# Patient Record
Sex: FEMALE | Race: WHITE | NOT HISPANIC OR LATINO | Employment: OTHER | URBAN - METROPOLITAN AREA
[De-identification: names, ages, dates, MRNs, and addresses within clinical notes are randomized per-mention and may not be internally consistent; named-entity substitution may affect disease eponyms.]

---

## 2017-01-05 ENCOUNTER — RECORDS - HEALTHEAST (OUTPATIENT)
Dept: LAB | Facility: CLINIC | Age: 82
End: 2017-01-05

## 2017-01-05 LAB
CHOLEST SERPL-MCNC: 197 MG/DL
FASTING STATUS PATIENT QL REPORTED: NORMAL
HDLC SERPL-MCNC: 76 MG/DL
LDLC SERPL CALC-MCNC: 97 MG/DL
TRIGL SERPL-MCNC: 120 MG/DL

## 2017-06-05 ENCOUNTER — RECORDS - HEALTHEAST (OUTPATIENT)
Dept: LAB | Facility: CLINIC | Age: 82
End: 2017-06-05

## 2017-06-06 LAB
BASOPHILS # BLD AUTO: 0 THOU/UL (ref 0–0.2)
BASOPHILS NFR BLD AUTO: 1 % (ref 0–2)
EOSINOPHIL # BLD AUTO: 0.1 THOU/UL (ref 0–0.4)
EOSINOPHIL NFR BLD AUTO: 2 % (ref 0–6)
ERYTHROCYTE [DISTWIDTH] IN BLOOD BY AUTOMATED COUNT: 12 % (ref 11–14.5)
HCT VFR BLD AUTO: 45.1 % (ref 35–47)
HGB BLD-MCNC: 14.9 G/DL (ref 12–16)
LYMPHOCYTES # BLD AUTO: 1.4 THOU/UL (ref 0.8–4.4)
LYMPHOCYTES NFR BLD AUTO: 26 % (ref 20–40)
MCH RBC QN AUTO: 32.5 PG (ref 27–34)
MCHC RBC AUTO-ENTMCNC: 33 G/DL (ref 32–36)
MCV RBC AUTO: 98 FL (ref 80–100)
MONOCYTES # BLD AUTO: 0.5 THOU/UL (ref 0–0.9)
MONOCYTES NFR BLD AUTO: 9 % (ref 2–10)
NEUTROPHILS # BLD AUTO: 3.4 THOU/UL (ref 2–7.7)
NEUTROPHILS NFR BLD AUTO: 62 % (ref 50–70)
PATH REPORT.MICROSCOPIC SPEC OTHER STN: ABNORMAL
PLATELET # BLD AUTO: 300 THOU/UL (ref 140–440)
PMV BLD AUTO: 10.7 FL (ref 8.5–12.5)
RBC # BLD AUTO: 4.59 MILL/UL (ref 3.8–5.4)
WBC: 5.4 THOU/UL (ref 4–11)

## 2017-06-08 LAB
LAB AP CHARGES (HE HISTORICAL CONVERSION): NORMAL
PATH REPORT.COMMENTS IMP SPEC: NORMAL
PATH REPORT.COMMENTS IMP SPEC: NORMAL
PATH REPORT.FINAL DX SPEC: NORMAL
PATH REPORT.RELEVANT HX SPEC: NORMAL

## 2017-07-06 ENCOUNTER — RECORDS - HEALTHEAST (OUTPATIENT)
Dept: LAB | Facility: CLINIC | Age: 82
End: 2017-07-06

## 2017-07-06 LAB
CHOLEST SERPL-MCNC: 224 MG/DL
FASTING STATUS PATIENT QL REPORTED: YES
HDLC SERPL-MCNC: 59 MG/DL
LDLC SERPL CALC-MCNC: 131 MG/DL
TRIGL SERPL-MCNC: 172 MG/DL

## 2018-04-09 ENCOUNTER — RECORDS - HEALTHEAST (OUTPATIENT)
Dept: LAB | Facility: CLINIC | Age: 83
End: 2018-04-09

## 2018-04-09 LAB
ANION GAP SERPL CALCULATED.3IONS-SCNC: 9 MMOL/L (ref 5–18)
BUN SERPL-MCNC: 22 MG/DL (ref 8–28)
CALCIUM SERPL-MCNC: 10.4 MG/DL (ref 8.5–10.5)
CHLORIDE BLD-SCNC: 106 MMOL/L (ref 98–107)
CHOLEST SERPL-MCNC: 200 MG/DL
CO2 SERPL-SCNC: 25 MMOL/L (ref 22–31)
CREAT SERPL-MCNC: 0.8 MG/DL (ref 0.6–1.1)
FASTING STATUS PATIENT QL REPORTED: ABNORMAL
GFR SERPL CREATININE-BSD FRML MDRD: >60 ML/MIN/1.73M2
GLUCOSE BLD-MCNC: 121 MG/DL (ref 70–125)
HDLC SERPL-MCNC: 69 MG/DL
LDLC SERPL CALC-MCNC: 106 MG/DL
POTASSIUM BLD-SCNC: 4.7 MMOL/L (ref 3.5–5)
SODIUM SERPL-SCNC: 140 MMOL/L (ref 136–145)
TRIGL SERPL-MCNC: 125 MG/DL

## 2018-07-02 ENCOUNTER — RECORDS - HEALTHEAST (OUTPATIENT)
Dept: LAB | Facility: CLINIC | Age: 83
End: 2018-07-02

## 2018-07-04 LAB — BACTERIA SPEC CULT: NO GROWTH

## 2018-07-25 ENCOUNTER — RECORDS - HEALTHEAST (OUTPATIENT)
Dept: LAB | Facility: CLINIC | Age: 83
End: 2018-07-25

## 2018-07-27 LAB — BACTERIA SPEC CULT: NORMAL

## 2018-09-21 ENCOUNTER — RECORDS - HEALTHEAST (OUTPATIENT)
Dept: LAB | Facility: CLINIC | Age: 83
End: 2018-09-21

## 2018-09-23 LAB — BACTERIA SPEC CULT: ABNORMAL

## 2018-10-11 ENCOUNTER — RECORDS - HEALTHEAST (OUTPATIENT)
Dept: LAB | Facility: CLINIC | Age: 83
End: 2018-10-11

## 2018-10-11 LAB — TSH SERPL DL<=0.005 MIU/L-ACNC: 0.08 UIU/ML (ref 0.3–5)

## 2018-10-12 LAB — 25(OH)D3 SERPL-MCNC: 26.3 NG/ML (ref 30–80)

## 2018-11-07 ENCOUNTER — RECORDS - HEALTHEAST (OUTPATIENT)
Dept: ADMINISTRATIVE | Facility: OTHER | Age: 83
End: 2018-11-07

## 2018-11-13 ENCOUNTER — HOSPITAL ENCOUNTER (OUTPATIENT)
Dept: NUCLEAR MEDICINE | Facility: HOSPITAL | Age: 83
Discharge: HOME | End: 2018-11-13

## 2018-11-13 DIAGNOSIS — E05.90 HYPERTHYROIDISM: ICD-10-CM

## 2018-11-14 ENCOUNTER — HOSPITAL ENCOUNTER (OUTPATIENT)
Dept: NUCLEAR MEDICINE | Facility: HOSPITAL | Age: 83
Discharge: HOME | End: 2018-11-14

## 2018-11-15 ENCOUNTER — RECORDS - HEALTHEAST (OUTPATIENT)
Dept: LAB | Facility: CLINIC | Age: 83
End: 2018-11-15

## 2018-11-15 LAB
ALBUMIN SERPL-MCNC: 3.8 G/DL (ref 3.5–5)
ALP SERPL-CCNC: 58 U/L (ref 45–120)
ALT SERPL W P-5'-P-CCNC: 12 U/L (ref 0–45)
ANION GAP SERPL CALCULATED.3IONS-SCNC: 11 MMOL/L (ref 5–18)
AST SERPL W P-5'-P-CCNC: 16 U/L (ref 0–40)
BILIRUB SERPL-MCNC: 0.6 MG/DL (ref 0–1)
BUN SERPL-MCNC: 22 MG/DL (ref 8–28)
CALCIUM SERPL-MCNC: 10.5 MG/DL (ref 8.5–10.5)
CHLORIDE BLD-SCNC: 105 MMOL/L (ref 98–107)
CO2 SERPL-SCNC: 25 MMOL/L (ref 22–31)
CREAT SERPL-MCNC: 0.77 MG/DL (ref 0.6–1.1)
GFR SERPL CREATININE-BSD FRML MDRD: >60 ML/MIN/1.73M2
GLUCOSE BLD-MCNC: 112 MG/DL (ref 70–125)
POTASSIUM BLD-SCNC: 4.4 MMOL/L (ref 3.5–5)
PROT SERPL-MCNC: 7 G/DL (ref 6–8)
SODIUM SERPL-SCNC: 141 MMOL/L (ref 136–145)

## 2018-12-01 ENCOUNTER — RECORDS - HEALTHEAST (OUTPATIENT)
Dept: LAB | Facility: CLINIC | Age: 83
End: 2018-12-01

## 2018-12-03 ENCOUNTER — HOSPITAL ENCOUNTER (OUTPATIENT)
Facility: CLINIC | Age: 83
End: 2018-12-03
Attending: INTERNAL MEDICINE | Admitting: INTERNAL MEDICINE
Payer: MEDICARE

## 2018-12-04 LAB
BACTERIA SPEC CULT: ABNORMAL
BACTERIA SPEC CULT: ABNORMAL

## 2018-12-20 RX ORDER — SUCRALFATE 1 G/1
1 TABLET ORAL
COMMUNITY
End: 2018-12-24 | Stop reason: HOSPADM

## 2018-12-20 RX ORDER — ASPIRIN 81 MG/1
81 TABLET ORAL DAILY
COMMUNITY
End: 2018-12-24 | Stop reason: HOSPADM

## 2018-12-20 RX ORDER — PRAVASTATIN SODIUM 20 MG
20 TABLET ORAL DAILY
COMMUNITY
End: 2018-12-24 | Stop reason: HOSPADM

## 2018-12-20 RX ORDER — AMLODIPINE BESYLATE 5 MG/1
5 TABLET ORAL DAILY
COMMUNITY
End: 2018-12-24 | Stop reason: HOSPADM

## 2018-12-20 RX ORDER — OMEPRAZOLE 40 MG/1
40 CAPSULE, DELAYED RELEASE ORAL AT BEDTIME
COMMUNITY
End: 2018-12-24 | Stop reason: HOSPADM

## 2018-12-20 SDOH — HEALTH STABILITY: MENTAL HEALTH: HOW OFTEN DO YOU HAVE A DRINK CONTAINING ALCOHOL?: NEVER

## 2018-12-31 ENCOUNTER — RECORDS - HEALTHEAST (OUTPATIENT)
Dept: LAB | Facility: CLINIC | Age: 83
End: 2018-12-31

## 2018-12-31 ENCOUNTER — RECORDS - HEALTHEAST (OUTPATIENT)
Dept: ADMINISTRATIVE | Facility: OTHER | Age: 83
End: 2018-12-31

## 2018-12-31 LAB
ANION GAP SERPL CALCULATED.3IONS-SCNC: 13 MMOL/L (ref 5–18)
BUN SERPL-MCNC: 39 MG/DL (ref 8–28)
CALCIUM SERPL-MCNC: 9.5 MG/DL (ref 8.5–10.5)
CHLORIDE BLD-SCNC: 111 MMOL/L (ref 98–107)
CO2 SERPL-SCNC: 14 MMOL/L (ref 22–31)
CREAT SERPL-MCNC: 2.29 MG/DL (ref 0.6–1.1)
GFR SERPL CREATININE-BSD FRML MDRD: 20 ML/MIN/1.73M2
GLUCOSE BLD-MCNC: 109 MG/DL (ref 70–125)
POTASSIUM BLD-SCNC: 3.6 MMOL/L (ref 3.5–5)
SODIUM SERPL-SCNC: 138 MMOL/L (ref 136–145)

## 2019-01-01 LAB — BACTERIA SPEC CULT: NO GROWTH

## 2019-01-07 ENCOUNTER — RECORDS - HEALTHEAST (OUTPATIENT)
Dept: LAB | Facility: CLINIC | Age: 84
End: 2019-01-07

## 2019-01-07 LAB
ALBUMIN SERPL-MCNC: 3.2 G/DL (ref 3.5–5)
ALP SERPL-CCNC: 64 U/L (ref 45–120)
ALT SERPL W P-5'-P-CCNC: 9 U/L (ref 0–45)
ANION GAP SERPL CALCULATED.3IONS-SCNC: 6 MMOL/L (ref 5–18)
AST SERPL W P-5'-P-CCNC: 14 U/L (ref 0–40)
BILIRUB SERPL-MCNC: 0.2 MG/DL (ref 0–1)
BUN SERPL-MCNC: 33 MG/DL (ref 8–28)
CALCIUM SERPL-MCNC: 9.5 MG/DL (ref 8.5–10.5)
CHLORIDE BLD-SCNC: 108 MMOL/L (ref 98–107)
CO2 SERPL-SCNC: 24 MMOL/L (ref 22–31)
CREAT SERPL-MCNC: 1.64 MG/DL (ref 0.6–1.1)
ERYTHROCYTE [DISTWIDTH] IN BLOOD BY AUTOMATED COUNT: 13.7 % (ref 11–14.5)
GFR SERPL CREATININE-BSD FRML MDRD: 29 ML/MIN/1.73M2
GLUCOSE BLD-MCNC: 139 MG/DL (ref 70–125)
HCT VFR BLD AUTO: 36 % (ref 35–47)
HGB BLD-MCNC: 11.5 G/DL (ref 12–16)
MCH RBC QN AUTO: 30.8 PG (ref 27–34)
MCHC RBC AUTO-ENTMCNC: 31.9 G/DL (ref 32–36)
MCV RBC AUTO: 97 FL (ref 80–100)
PLATELET # BLD AUTO: 334 THOU/UL (ref 140–440)
PMV BLD AUTO: 10.2 FL (ref 8.5–12.5)
POTASSIUM BLD-SCNC: 4.3 MMOL/L (ref 3.5–5)
PROT SERPL-MCNC: 7.4 G/DL (ref 6–8)
RBC # BLD AUTO: 3.73 MILL/UL (ref 3.8–5.4)
SODIUM SERPL-SCNC: 138 MMOL/L (ref 136–145)
T4 FREE SERPL-MCNC: 1 NG/DL (ref 0.7–1.8)
TSH SERPL DL<=0.005 MIU/L-ACNC: 0.02 UIU/ML (ref 0.3–5)
WBC: 8.2 THOU/UL (ref 4–11)

## 2019-01-08 ENCOUNTER — RECORDS - HEALTHEAST (OUTPATIENT)
Dept: ADMINISTRATIVE | Facility: OTHER | Age: 84
End: 2019-01-08

## 2019-01-10 ENCOUNTER — RECORDS - HEALTHEAST (OUTPATIENT)
Dept: ADMINISTRATIVE | Facility: OTHER | Age: 84
End: 2019-01-10

## 2019-01-11 ENCOUNTER — RECORDS - HEALTHEAST (OUTPATIENT)
Dept: ADMINISTRATIVE | Facility: OTHER | Age: 84
End: 2019-01-11

## 2019-01-14 ENCOUNTER — RECORDS - HEALTHEAST (OUTPATIENT)
Dept: LAB | Facility: CLINIC | Age: 84
End: 2019-01-14

## 2019-01-14 LAB
ALBUMIN SERPL-MCNC: 3.3 G/DL (ref 3.5–5)
ALBUMIN UR-MCNC: NEGATIVE MG/DL
ALP SERPL-CCNC: 53 U/L (ref 45–120)
ALT SERPL W P-5'-P-CCNC: 15 U/L (ref 0–45)
ANION GAP SERPL CALCULATED.3IONS-SCNC: 11 MMOL/L (ref 5–18)
APPEARANCE UR: CLEAR
AST SERPL W P-5'-P-CCNC: 18 U/L (ref 0–40)
BILIRUB SERPL-MCNC: 0.4 MG/DL (ref 0–1)
BILIRUB UR QL STRIP: NEGATIVE
BUN SERPL-MCNC: 29 MG/DL (ref 8–28)
CALCIUM SERPL-MCNC: 9.8 MG/DL (ref 8.5–10.5)
CHLORIDE BLD-SCNC: 108 MMOL/L (ref 98–107)
CO2 SERPL-SCNC: 19 MMOL/L (ref 22–31)
COLOR UR AUTO: NORMAL
CREAT SERPL-MCNC: 1.32 MG/DL (ref 0.6–1.1)
CREAT UR-MCNC: 25.6 MG/DL
GFR SERPL CREATININE-BSD FRML MDRD: 38 ML/MIN/1.73M2
GLUCOSE BLD-MCNC: 113 MG/DL (ref 70–125)
GLUCOSE UR STRIP-MCNC: NEGATIVE MG/DL
HGB BLD-MCNC: 11.9 G/DL (ref 12–16)
HGB UR QL STRIP: NEGATIVE
KETONES UR STRIP-MCNC: NEGATIVE MG/DL
LEUKOCYTE ESTERASE UR QL STRIP: NEGATIVE
NITRATE UR QL: NEGATIVE
PH UR STRIP: 7 [PH] (ref 4.5–8)
PHOSPHATE SERPL-MCNC: 3 MG/DL (ref 2.5–4.5)
POTASSIUM BLD-SCNC: 4.4 MMOL/L (ref 3.5–5)
PROT SERPL-MCNC: 7.3 G/DL (ref 6–8)
SODIUM SERPL-SCNC: 138 MMOL/L (ref 136–145)
SP GR UR STRIP: 1.01 (ref 1–1.03)
UROBILINOGEN UR STRIP-ACNC: NORMAL

## 2019-01-21 ENCOUNTER — RECORDS - HEALTHEAST (OUTPATIENT)
Dept: LAB | Facility: CLINIC | Age: 84
End: 2019-01-21

## 2019-01-21 LAB
ALBUMIN SERPL-MCNC: 3.4 G/DL (ref 3.5–5)
ALBUMIN UR-MCNC: NEGATIVE MG/DL
ALP SERPL-CCNC: 51 U/L (ref 45–120)
ALT SERPL W P-5'-P-CCNC: 11 U/L (ref 0–45)
ANION GAP SERPL CALCULATED.3IONS-SCNC: 9 MMOL/L (ref 5–18)
AST SERPL W P-5'-P-CCNC: 15 U/L (ref 0–40)
BILIRUB SERPL-MCNC: 0.4 MG/DL (ref 0–1)
BUN SERPL-MCNC: 26 MG/DL (ref 8–28)
CALCIUM SERPL-MCNC: 9.7 MG/DL (ref 8.5–10.5)
CHLORIDE BLD-SCNC: 107 MMOL/L (ref 98–107)
CO2 SERPL-SCNC: 22 MMOL/L (ref 22–31)
CREAT SERPL-MCNC: 1.22 MG/DL (ref 0.6–1.1)
CREAT UR-MCNC: 21 MG/DL
ERYTHROCYTE [DISTWIDTH] IN BLOOD BY AUTOMATED COUNT: 13.2 % (ref 11–14.5)
GFR SERPL CREATININE-BSD FRML MDRD: 42 ML/MIN/1.73M2
GLUCOSE BLD-MCNC: 123 MG/DL (ref 70–125)
HCT VFR BLD AUTO: 37.4 % (ref 35–47)
HGB BLD-MCNC: 11.8 G/DL (ref 12–16)
MCH RBC QN AUTO: 30.6 PG (ref 27–34)
MCHC RBC AUTO-ENTMCNC: 31.6 G/DL (ref 32–36)
MCV RBC AUTO: 97 FL (ref 80–100)
PLATELET # BLD AUTO: 290 THOU/UL (ref 140–440)
PMV BLD AUTO: 10.5 FL (ref 8.5–12.5)
POTASSIUM BLD-SCNC: 4.2 MMOL/L (ref 3.5–5)
PROT SERPL-MCNC: 7.2 G/DL (ref 6–8)
RBC # BLD AUTO: 3.86 MILL/UL (ref 3.8–5.4)
SODIUM SERPL-SCNC: 138 MMOL/L (ref 136–145)
WBC: 7.7 THOU/UL (ref 4–11)

## 2019-02-04 ENCOUNTER — RECORDS - HEALTHEAST (OUTPATIENT)
Dept: LAB | Facility: CLINIC | Age: 84
End: 2019-02-04

## 2019-02-04 LAB
ALBUMIN UR-MCNC: NEGATIVE MG/DL
ANION GAP SERPL CALCULATED.3IONS-SCNC: 10 MMOL/L (ref 5–18)
BUN SERPL-MCNC: 27 MG/DL (ref 8–28)
CALCIUM SERPL-MCNC: 10.2 MG/DL (ref 8.5–10.5)
CHLORIDE BLD-SCNC: 107 MMOL/L (ref 98–107)
CO2 SERPL-SCNC: 21 MMOL/L (ref 22–31)
CREAT SERPL-MCNC: 1.2 MG/DL (ref 0.6–1.1)
GFR SERPL CREATININE-BSD FRML MDRD: 42 ML/MIN/1.73M2
GLUCOSE BLD-MCNC: 115 MG/DL (ref 70–125)
POTASSIUM BLD-SCNC: 4.2 MMOL/L (ref 3.5–5)
SODIUM SERPL-SCNC: 138 MMOL/L (ref 136–145)

## 2019-02-25 ENCOUNTER — RECORDS - HEALTHEAST (OUTPATIENT)
Dept: LAB | Facility: CLINIC | Age: 84
End: 2019-02-25

## 2019-02-26 LAB — BACTERIA SPEC CULT: NO GROWTH

## 2019-02-28 ENCOUNTER — RECORDS - HEALTHEAST (OUTPATIENT)
Dept: ADMINISTRATIVE | Facility: OTHER | Age: 84
End: 2019-02-28

## 2019-03-13 ENCOUNTER — HOSPITAL ENCOUNTER (OUTPATIENT)
Dept: NUCLEAR MEDICINE | Facility: HOSPITAL | Age: 84
Discharge: HOME | End: 2019-03-13

## 2019-03-13 DIAGNOSIS — E05.20 TOXIC MULTINODULAR GOITER W/O CRISIS: ICD-10-CM

## 2019-04-08 ENCOUNTER — RECORDS - HEALTHEAST (OUTPATIENT)
Dept: LAB | Facility: CLINIC | Age: 84
End: 2019-04-08

## 2019-04-08 LAB
ANION GAP SERPL CALCULATED.3IONS-SCNC: 12 MMOL/L (ref 5–18)
BUN SERPL-MCNC: 41 MG/DL (ref 8–28)
CALCIUM SERPL-MCNC: 10.2 MG/DL (ref 8.5–10.5)
CHLORIDE BLD-SCNC: 110 MMOL/L (ref 98–107)
CO2 SERPL-SCNC: 17 MMOL/L (ref 22–31)
CREAT SERPL-MCNC: 2.22 MG/DL (ref 0.6–1.1)
GFR SERPL CREATININE-BSD FRML MDRD: 21 ML/MIN/1.73M2
GLUCOSE BLD-MCNC: 95 MG/DL (ref 70–125)
POTASSIUM BLD-SCNC: 4.2 MMOL/L (ref 3.5–5)
SODIUM SERPL-SCNC: 139 MMOL/L (ref 136–145)

## 2019-04-15 ENCOUNTER — RECORDS - HEALTHEAST (OUTPATIENT)
Dept: LAB | Facility: CLINIC | Age: 84
End: 2019-04-15

## 2019-04-15 LAB
ANION GAP SERPL CALCULATED.3IONS-SCNC: 6 MMOL/L (ref 5–18)
BUN SERPL-MCNC: 37 MG/DL (ref 8–28)
CALCIUM SERPL-MCNC: 10.1 MG/DL (ref 8.5–10.5)
CHLORIDE BLD-SCNC: 110 MMOL/L (ref 98–107)
CO2 SERPL-SCNC: 23 MMOL/L (ref 22–31)
CREAT SERPL-MCNC: 1.96 MG/DL (ref 0.6–1.1)
GFR SERPL CREATININE-BSD FRML MDRD: 24 ML/MIN/1.73M2
GLUCOSE BLD-MCNC: 122 MG/DL (ref 70–125)
POTASSIUM BLD-SCNC: 4.5 MMOL/L (ref 3.5–5)
SODIUM SERPL-SCNC: 139 MMOL/L (ref 136–145)

## 2019-04-22 ENCOUNTER — RECORDS - HEALTHEAST (OUTPATIENT)
Dept: LAB | Facility: CLINIC | Age: 84
End: 2019-04-22

## 2019-04-22 LAB
ANION GAP SERPL CALCULATED.3IONS-SCNC: 8 MMOL/L (ref 5–18)
BUN SERPL-MCNC: 38 MG/DL (ref 8–28)
CALCIUM SERPL-MCNC: 9.6 MG/DL (ref 8.5–10.5)
CHLORIDE BLD-SCNC: 107 MMOL/L (ref 98–107)
CO2 SERPL-SCNC: 23 MMOL/L (ref 22–31)
CREAT SERPL-MCNC: 1.52 MG/DL (ref 0.6–1.1)
GFR SERPL CREATININE-BSD FRML MDRD: 32 ML/MIN/1.73M2
GLUCOSE BLD-MCNC: 126 MG/DL (ref 70–125)
POTASSIUM BLD-SCNC: 4 MMOL/L (ref 3.5–5)
SODIUM SERPL-SCNC: 138 MMOL/L (ref 136–145)

## 2019-04-29 ENCOUNTER — RECORDS - HEALTHEAST (OUTPATIENT)
Dept: LAB | Facility: CLINIC | Age: 84
End: 2019-04-29

## 2019-04-29 LAB
ANION GAP SERPL CALCULATED.3IONS-SCNC: 10 MMOL/L (ref 5–18)
BUN SERPL-MCNC: 29 MG/DL (ref 8–28)
CALCIUM SERPL-MCNC: 10.1 MG/DL (ref 8.5–10.5)
CHLORIDE BLD-SCNC: 108 MMOL/L (ref 98–107)
CO2 SERPL-SCNC: 22 MMOL/L (ref 22–31)
CREAT SERPL-MCNC: 1.44 MG/DL (ref 0.6–1.1)
GFR SERPL CREATININE-BSD FRML MDRD: 34 ML/MIN/1.73M2
GLUCOSE BLD-MCNC: 112 MG/DL (ref 70–125)
POTASSIUM BLD-SCNC: 4.2 MMOL/L (ref 3.5–5)
SODIUM SERPL-SCNC: 140 MMOL/L (ref 136–145)

## 2019-05-06 ENCOUNTER — RECORDS - HEALTHEAST (OUTPATIENT)
Dept: LAB | Facility: CLINIC | Age: 84
End: 2019-05-06

## 2019-05-06 LAB
ALBUMIN SERPL-MCNC: 3.6 G/DL (ref 3.5–5)
ALBUMIN UR-MCNC: NEGATIVE MG/DL
ANION GAP SERPL CALCULATED.3IONS-SCNC: 12 MMOL/L (ref 5–18)
APPEARANCE UR: CLEAR
BACTERIA #/AREA URNS HPF: ABNORMAL HPF
BASOPHILS # BLD AUTO: 0 THOU/UL (ref 0–0.2)
BASOPHILS NFR BLD AUTO: 1 % (ref 0–2)
BILIRUB UR QL STRIP: NEGATIVE
BUN SERPL-MCNC: 39 MG/DL (ref 8–28)
CALCIUM SERPL-MCNC: 10.2 MG/DL (ref 8.5–10.5)
CHLORIDE BLD-SCNC: 110 MMOL/L (ref 98–107)
CO2 SERPL-SCNC: 19 MMOL/L (ref 22–31)
COLOR UR AUTO: COLORLESS
CREAT SERPL-MCNC: 1.98 MG/DL (ref 0.6–1.1)
CREAT UR-MCNC: 22.4 MG/DL
EOSINOPHIL # BLD AUTO: 0.1 THOU/UL (ref 0–0.4)
EOSINOPHIL NFR BLD AUTO: 1 % (ref 0–6)
ERYTHROCYTE [DISTWIDTH] IN BLOOD BY AUTOMATED COUNT: 13.4 % (ref 11–14.5)
GFR SERPL CREATININE-BSD FRML MDRD: 24 ML/MIN/1.73M2
GLUCOSE BLD-MCNC: 97 MG/DL (ref 70–125)
GLUCOSE UR STRIP-MCNC: NEGATIVE MG/DL
HCT VFR BLD AUTO: 37.5 % (ref 35–47)
HGB BLD-MCNC: 12 G/DL (ref 12–16)
HGB UR QL STRIP: NEGATIVE
KETONES UR STRIP-MCNC: NEGATIVE MG/DL
LEUKOCYTE ESTERASE UR QL STRIP: ABNORMAL
LYMPHOCYTES # BLD AUTO: 0.9 THOU/UL (ref 0.8–4.4)
LYMPHOCYTES NFR BLD AUTO: 18 % (ref 20–40)
MCH RBC QN AUTO: 31.3 PG (ref 27–34)
MCHC RBC AUTO-ENTMCNC: 32 G/DL (ref 32–36)
MCV RBC AUTO: 98 FL (ref 80–100)
MONOCYTES # BLD AUTO: 0.6 THOU/UL (ref 0–0.9)
MONOCYTES NFR BLD AUTO: 12 % (ref 2–10)
MUCOUS THREADS #/AREA URNS LPF: ABNORMAL LPF
NEUTROPHILS # BLD AUTO: 3.5 THOU/UL (ref 2–7.7)
NEUTROPHILS NFR BLD AUTO: 68 % (ref 50–70)
NITRATE UR QL: NEGATIVE
PH UR STRIP: 7 [PH] (ref 4.5–8)
PHOSPHATE SERPL-MCNC: 3.2 MG/DL (ref 2.5–4.5)
PLATELET # BLD AUTO: 313 THOU/UL (ref 140–440)
PMV BLD AUTO: 10.1 FL (ref 8.5–12.5)
POTASSIUM BLD-SCNC: 4.7 MMOL/L (ref 3.5–5)
PROTEIN, RANDOM URINE - HISTORICAL: 8 MG/DL
PROTEIN/CREAT RATIO, RANDOM UR: 0.36
PTH-INTACT SERPL-MCNC: 90 PG/ML (ref 10–86)
RBC # BLD AUTO: 3.83 MILL/UL (ref 3.8–5.4)
RBC #/AREA URNS AUTO: ABNORMAL HPF
SODIUM SERPL-SCNC: 141 MMOL/L (ref 136–145)
SP GR UR STRIP: 1.01 (ref 1–1.03)
SQUAMOUS #/AREA URNS AUTO: ABNORMAL LPF
UROBILINOGEN UR STRIP-ACNC: ABNORMAL
WBC #/AREA URNS AUTO: ABNORMAL HPF
WBC CLUMPS #/AREA URNS HPF: PRESENT /[HPF]
WBC: 5.2 THOU/UL (ref 4–11)

## 2019-05-21 ENCOUNTER — RECORDS - HEALTHEAST (OUTPATIENT)
Dept: LAB | Facility: CLINIC | Age: 84
End: 2019-05-21

## 2019-05-21 LAB
ANION GAP SERPL CALCULATED.3IONS-SCNC: 10 MMOL/L (ref 5–18)
BUN SERPL-MCNC: 31 MG/DL (ref 8–28)
CALCIUM SERPL-MCNC: 10.3 MG/DL (ref 8.5–10.5)
CHLORIDE BLD-SCNC: 106 MMOL/L (ref 98–107)
CO2 SERPL-SCNC: 24 MMOL/L (ref 22–31)
CREAT SERPL-MCNC: 1.64 MG/DL (ref 0.6–1.1)
GFR SERPL CREATININE-BSD FRML MDRD: 29 ML/MIN/1.73M2
GLUCOSE BLD-MCNC: 170 MG/DL (ref 70–125)
POTASSIUM BLD-SCNC: 4.4 MMOL/L (ref 3.5–5)
SODIUM SERPL-SCNC: 140 MMOL/L (ref 136–145)

## 2019-06-03 ENCOUNTER — RECORDS - HEALTHEAST (OUTPATIENT)
Dept: LAB | Facility: CLINIC | Age: 84
End: 2019-06-03

## 2019-06-03 LAB
ANION GAP SERPL CALCULATED.3IONS-SCNC: 8 MMOL/L (ref 5–18)
BASOPHILS # BLD AUTO: 0 THOU/UL (ref 0–0.2)
BASOPHILS NFR BLD AUTO: 1 % (ref 0–2)
BUN SERPL-MCNC: 28 MG/DL (ref 8–28)
CALCIUM SERPL-MCNC: 10.3 MG/DL (ref 8.5–10.5)
CHLORIDE BLD-SCNC: 108 MMOL/L (ref 98–107)
CO2 SERPL-SCNC: 24 MMOL/L (ref 22–31)
CREAT SERPL-MCNC: 1.44 MG/DL (ref 0.6–1.1)
EOSINOPHIL # BLD AUTO: 0.1 THOU/UL (ref 0–0.4)
EOSINOPHIL NFR BLD AUTO: 1 % (ref 0–6)
ERYTHROCYTE [DISTWIDTH] IN BLOOD BY AUTOMATED COUNT: 14.1 % (ref 11–14.5)
GFR SERPL CREATININE-BSD FRML MDRD: 34 ML/MIN/1.73M2
GLUCOSE BLD-MCNC: 131 MG/DL (ref 70–125)
HCT VFR BLD AUTO: 35.9 % (ref 35–47)
HGB BLD-MCNC: 11.4 G/DL (ref 12–16)
LYMPHOCYTES # BLD AUTO: 1 THOU/UL (ref 0.8–4.4)
LYMPHOCYTES NFR BLD AUTO: 15 % (ref 20–40)
MCH RBC QN AUTO: 31.5 PG (ref 27–34)
MCHC RBC AUTO-ENTMCNC: 31.8 G/DL (ref 32–36)
MCV RBC AUTO: 99 FL (ref 80–100)
MONOCYTES # BLD AUTO: 0.7 THOU/UL (ref 0–0.9)
MONOCYTES NFR BLD AUTO: 11 % (ref 2–10)
NEUTROPHILS # BLD AUTO: 4.8 THOU/UL (ref 2–7.7)
NEUTROPHILS NFR BLD AUTO: 72 % (ref 50–70)
PLATELET # BLD AUTO: 253 THOU/UL (ref 140–440)
PMV BLD AUTO: 10.5 FL (ref 8.5–12.5)
POTASSIUM BLD-SCNC: 4.4 MMOL/L (ref 3.5–5)
RBC # BLD AUTO: 3.62 MILL/UL (ref 3.8–5.4)
SODIUM SERPL-SCNC: 140 MMOL/L (ref 136–145)
WBC: 6.7 THOU/UL (ref 4–11)

## 2019-06-05 ENCOUNTER — SURGERY - HEALTHEAST (OUTPATIENT)
Dept: SURGERY | Facility: CLINIC | Age: 84
End: 2019-06-05

## 2019-06-05 ENCOUNTER — ANESTHESIA - HEALTHEAST (OUTPATIENT)
Dept: SURGERY | Facility: CLINIC | Age: 84
End: 2019-06-05

## 2019-06-05 ASSESSMENT — MIFFLIN-ST. JEOR: SCORE: 930.25

## 2019-06-17 ENCOUNTER — RECORDS - HEALTHEAST (OUTPATIENT)
Dept: LAB | Facility: CLINIC | Age: 84
End: 2019-06-17

## 2019-06-17 LAB
ALBUMIN SERPL-MCNC: 3.7 G/DL (ref 3.5–5)
ANION GAP SERPL CALCULATED.3IONS-SCNC: 8 MMOL/L (ref 5–18)
ANION GAP SERPL CALCULATED.3IONS-SCNC: 8 MMOL/L (ref 5–18)
BASOPHILS # BLD AUTO: 0 THOU/UL (ref 0–0.2)
BASOPHILS NFR BLD AUTO: 1 % (ref 0–2)
BUN SERPL-MCNC: 32 MG/DL (ref 8–28)
BUN SERPL-MCNC: 32 MG/DL (ref 8–28)
CALCIUM SERPL-MCNC: 10.2 MG/DL (ref 8.5–10.5)
CALCIUM SERPL-MCNC: 10.2 MG/DL (ref 8.5–10.5)
CHLORIDE BLD-SCNC: 109 MMOL/L (ref 98–107)
CHLORIDE BLD-SCNC: 109 MMOL/L (ref 98–107)
CO2 SERPL-SCNC: 24 MMOL/L (ref 22–31)
CO2 SERPL-SCNC: 24 MMOL/L (ref 22–31)
CREAT SERPL-MCNC: 1.33 MG/DL (ref 0.6–1.1)
CREAT SERPL-MCNC: 1.33 MG/DL (ref 0.6–1.1)
EOSINOPHIL # BLD AUTO: 0.1 THOU/UL (ref 0–0.4)
EOSINOPHIL NFR BLD AUTO: 1 % (ref 0–6)
ERYTHROCYTE [DISTWIDTH] IN BLOOD BY AUTOMATED COUNT: 14.3 % (ref 11–14.5)
GFR SERPL CREATININE-BSD FRML MDRD: 37 ML/MIN/1.73M2
GFR SERPL CREATININE-BSD FRML MDRD: 37 ML/MIN/1.73M2
GLUCOSE BLD-MCNC: 101 MG/DL (ref 70–125)
GLUCOSE BLD-MCNC: 101 MG/DL (ref 70–125)
HCT VFR BLD AUTO: 37.4 % (ref 35–47)
HGB BLD-MCNC: 11.8 G/DL (ref 12–16)
LYMPHOCYTES # BLD AUTO: 0.9 THOU/UL (ref 0.8–4.4)
LYMPHOCYTES NFR BLD AUTO: 16 % (ref 20–40)
MCH RBC QN AUTO: 32.1 PG (ref 27–34)
MCHC RBC AUTO-ENTMCNC: 31.6 G/DL (ref 32–36)
MCV RBC AUTO: 102 FL (ref 80–100)
MONOCYTES # BLD AUTO: 0.6 THOU/UL (ref 0–0.9)
MONOCYTES NFR BLD AUTO: 10 % (ref 2–10)
NEUTROPHILS # BLD AUTO: 4.4 THOU/UL (ref 2–7.7)
NEUTROPHILS NFR BLD AUTO: 73 % (ref 50–70)
PHOSPHATE SERPL-MCNC: 3.5 MG/DL (ref 2.5–4.5)
PLATELET # BLD AUTO: 278 THOU/UL (ref 140–440)
PMV BLD AUTO: 10.2 FL (ref 8.5–12.5)
POTASSIUM BLD-SCNC: 4.7 MMOL/L (ref 3.5–5)
POTASSIUM BLD-SCNC: 4.7 MMOL/L (ref 3.5–5)
PTH-INTACT SERPL-MCNC: 74 PG/ML (ref 10–86)
RBC # BLD AUTO: 3.68 MILL/UL (ref 3.8–5.4)
SODIUM SERPL-SCNC: 141 MMOL/L (ref 136–145)
SODIUM SERPL-SCNC: 141 MMOL/L (ref 136–145)
WBC: 6 THOU/UL (ref 4–11)

## 2019-07-01 ENCOUNTER — RECORDS - HEALTHEAST (OUTPATIENT)
Dept: LAB | Facility: CLINIC | Age: 84
End: 2019-07-01

## 2019-07-01 LAB
ALBUMIN SERPL-MCNC: 3.9 G/DL (ref 3.5–5)
ANION GAP SERPL CALCULATED.3IONS-SCNC: 8 MMOL/L (ref 5–18)
BUN SERPL-MCNC: 29 MG/DL (ref 8–28)
CALCIUM SERPL-MCNC: 10.1 MG/DL (ref 8.5–10.5)
CHLORIDE BLD-SCNC: 107 MMOL/L (ref 98–107)
CHOLEST SERPL-MCNC: 189 MG/DL
CO2 SERPL-SCNC: 26 MMOL/L (ref 22–31)
CREAT SERPL-MCNC: 1.49 MG/DL (ref 0.6–1.1)
FASTING STATUS PATIENT QL REPORTED: NORMAL
GFR SERPL CREATININE-BSD FRML MDRD: 33 ML/MIN/1.73M2
GLUCOSE BLD-MCNC: 114 MG/DL (ref 70–125)
HDLC SERPL-MCNC: 72 MG/DL
HGB BLD-MCNC: 11.8 G/DL (ref 12–16)
LDLC SERPL CALC-MCNC: 98 MG/DL
PHOSPHATE SERPL-MCNC: 3.4 MG/DL (ref 2.5–4.5)
POTASSIUM BLD-SCNC: 4.9 MMOL/L (ref 3.5–5)
SODIUM SERPL-SCNC: 141 MMOL/L (ref 136–145)
TRIGL SERPL-MCNC: 97 MG/DL

## 2019-07-17 ENCOUNTER — RECORDS - HEALTHEAST (OUTPATIENT)
Dept: LAB | Facility: CLINIC | Age: 84
End: 2019-07-17

## 2019-07-17 LAB
ANION GAP SERPL CALCULATED.3IONS-SCNC: 15 MMOL/L (ref 5–18)
BUN SERPL-MCNC: 31 MG/DL (ref 8–28)
CALCIUM SERPL-MCNC: 10.3 MG/DL (ref 8.5–10.5)
CHLORIDE BLD-SCNC: 107 MMOL/L (ref 98–107)
CO2 SERPL-SCNC: 17 MMOL/L (ref 22–31)
CREAT SERPL-MCNC: 1.35 MG/DL (ref 0.6–1.1)
GFR SERPL CREATININE-BSD FRML MDRD: 37 ML/MIN/1.73M2
GLUCOSE BLD-MCNC: 105 MG/DL (ref 70–125)
POTASSIUM BLD-SCNC: 4.7 MMOL/L (ref 3.5–5)
SODIUM SERPL-SCNC: 139 MMOL/L (ref 136–145)

## 2019-07-20 LAB — BACTERIA SPEC CULT: ABNORMAL

## 2019-09-09 ENCOUNTER — RECORDS - HEALTHEAST (OUTPATIENT)
Dept: LAB | Facility: CLINIC | Age: 84
End: 2019-09-09

## 2019-09-10 ENCOUNTER — RECORDS - HEALTHEAST (OUTPATIENT)
Dept: LAB | Facility: CLINIC | Age: 84
End: 2019-09-10

## 2019-09-10 LAB
ALBUMIN SERPL-MCNC: 4 G/DL (ref 3.5–5)
ANION GAP SERPL CALCULATED.3IONS-SCNC: 7 MMOL/L (ref 5–18)
BUN SERPL-MCNC: 31 MG/DL (ref 8–28)
CALCIUM SERPL-MCNC: 9.7 MG/DL (ref 8.5–10.5)
CHLORIDE BLD-SCNC: 105 MMOL/L (ref 98–107)
CO2 SERPL-SCNC: 28 MMOL/L (ref 22–31)
CREAT SERPL-MCNC: 1.22 MG/DL (ref 0.6–1.1)
GFR SERPL CREATININE-BSD FRML MDRD: 41 ML/MIN/1.73M2
GLUCOSE BLD-MCNC: 108 MG/DL (ref 70–125)
PHOSPHATE SERPL-MCNC: 3 MG/DL (ref 2.5–4.5)
POTASSIUM BLD-SCNC: 4.8 MMOL/L (ref 3.5–5)
SODIUM SERPL-SCNC: 140 MMOL/L (ref 136–145)

## 2019-09-11 LAB — BACTERIA SPEC CULT: NO GROWTH

## 2019-10-07 ENCOUNTER — RECORDS - HEALTHEAST (OUTPATIENT)
Dept: LAB | Facility: CLINIC | Age: 84
End: 2019-10-07

## 2019-10-07 LAB
ALBUMIN SERPL-MCNC: 3.8 G/DL (ref 3.5–5)
ALBUMIN UR-MCNC: NEGATIVE MG/DL
ANION GAP SERPL CALCULATED.3IONS-SCNC: 9 MMOL/L (ref 5–18)
APPEARANCE UR: CLEAR
BACTERIA #/AREA URNS HPF: ABNORMAL HPF
BILIRUB UR QL STRIP: NEGATIVE
BUN SERPL-MCNC: 30 MG/DL (ref 8–28)
CALCIUM SERPL-MCNC: 9.6 MG/DL (ref 8.5–10.5)
CHLORIDE BLD-SCNC: 105 MMOL/L (ref 98–107)
CO2 SERPL-SCNC: 25 MMOL/L (ref 22–31)
COLOR UR AUTO: ABNORMAL
CREAT SERPL-MCNC: 1.35 MG/DL (ref 0.6–1.1)
CREAT UR-MCNC: 38.4 MG/DL
GFR SERPL CREATININE-BSD FRML MDRD: 37 ML/MIN/1.73M2
GLUCOSE BLD-MCNC: 112 MG/DL (ref 70–125)
GLUCOSE UR STRIP-MCNC: NEGATIVE MG/DL
HGB BLD-MCNC: 13.4 G/DL (ref 12–16)
HGB UR QL STRIP: NEGATIVE
KETONES UR STRIP-MCNC: NEGATIVE MG/DL
LEUKOCYTE ESTERASE UR QL STRIP: NEGATIVE
NITRATE UR QL: NEGATIVE
PH UR STRIP: 6 [PH] (ref 4.5–8)
PHOSPHATE SERPL-MCNC: 3.2 MG/DL (ref 2.5–4.5)
POTASSIUM BLD-SCNC: 5.3 MMOL/L (ref 3.5–5)
PROTEIN, RANDOM URINE - HISTORICAL: <7 MG/DL
PROTEIN/CREAT RATIO, RANDOM UR: NORMAL
PTH-INTACT SERPL-MCNC: 102 PG/ML (ref 10–86)
RBC #/AREA URNS AUTO: ABNORMAL HPF
SODIUM SERPL-SCNC: 139 MMOL/L (ref 136–145)
SP GR UR STRIP: 1.01 (ref 1–1.03)
SQUAMOUS #/AREA URNS AUTO: ABNORMAL LPF
TSH SERPL DL<=0.005 MIU/L-ACNC: 2.53 UIU/ML (ref 0.3–5)
UROBILINOGEN UR STRIP-ACNC: ABNORMAL
WBC #/AREA URNS AUTO: ABNORMAL HPF

## 2019-11-06 ENCOUNTER — RECORDS - HEALTHEAST (OUTPATIENT)
Dept: LAB | Facility: CLINIC | Age: 84
End: 2019-11-06

## 2019-11-07 LAB — BACTERIA SPEC CULT: NO GROWTH

## 2019-11-18 ENCOUNTER — RECORDS - HEALTHEAST (OUTPATIENT)
Dept: LAB | Facility: CLINIC | Age: 84
End: 2019-11-18

## 2019-11-21 LAB
GLIADIN IGA SER-ACNC: 8.6 U/ML
GLIADIN IGG SER-ACNC: 1.2 U/ML
IGA SERPL-MCNC: 191 MG/DL (ref 65–400)
TTG IGA SER-ACNC: 7.1 U/ML
TTG IGG SER-ACNC: 3.4 U/ML

## 2019-12-05 ENCOUNTER — RECORDS - HEALTHEAST (OUTPATIENT)
Dept: LAB | Facility: CLINIC | Age: 84
End: 2019-12-05

## 2019-12-06 LAB — BACTERIA SPEC CULT: NO GROWTH

## 2020-01-03 ENCOUNTER — RECORDS - HEALTHEAST (OUTPATIENT)
Dept: LAB | Facility: CLINIC | Age: 85
End: 2020-01-03

## 2020-01-04 LAB — BACTERIA SPEC CULT: NO GROWTH

## 2020-01-09 ENCOUNTER — RECORDS - HEALTHEAST (OUTPATIENT)
Dept: LAB | Facility: CLINIC | Age: 85
End: 2020-01-09

## 2020-01-09 LAB
ALBUMIN SERPL-MCNC: 3.9 G/DL (ref 3.5–5)
ALP SERPL-CCNC: 61 U/L (ref 45–120)
ALT SERPL W P-5'-P-CCNC: 15 U/L (ref 0–45)
ANION GAP SERPL CALCULATED.3IONS-SCNC: 10 MMOL/L (ref 5–18)
AST SERPL W P-5'-P-CCNC: 17 U/L (ref 0–40)
BILIRUB SERPL-MCNC: 0.3 MG/DL (ref 0–1)
BUN SERPL-MCNC: 29 MG/DL (ref 8–28)
CALCIUM SERPL-MCNC: 9.9 MG/DL (ref 8.5–10.5)
CHLORIDE BLD-SCNC: 104 MMOL/L (ref 98–107)
CO2 SERPL-SCNC: 24 MMOL/L (ref 22–31)
CREAT SERPL-MCNC: 1.28 MG/DL (ref 0.6–1.1)
GFR SERPL CREATININE-BSD FRML MDRD: 39 ML/MIN/1.73M2
GLUCOSE BLD-MCNC: 98 MG/DL (ref 70–125)
POTASSIUM BLD-SCNC: 5.7 MMOL/L (ref 3.5–5)
PROT SERPL-MCNC: 7.3 G/DL (ref 6–8)
SODIUM SERPL-SCNC: 138 MMOL/L (ref 136–145)

## 2020-01-22 ENCOUNTER — RECORDS - HEALTHEAST (OUTPATIENT)
Dept: LAB | Facility: CLINIC | Age: 85
End: 2020-01-22

## 2020-01-22 LAB — POTASSIUM BLD-SCNC: 4.9 MMOL/L (ref 3.5–5)

## 2020-02-05 ENCOUNTER — RECORDS - HEALTHEAST (OUTPATIENT)
Dept: LAB | Facility: CLINIC | Age: 85
End: 2020-02-05

## 2020-02-07 LAB — BACTERIA SPEC CULT: ABNORMAL

## 2020-02-13 ENCOUNTER — RECORDS - HEALTHEAST (OUTPATIENT)
Dept: LAB | Facility: CLINIC | Age: 85
End: 2020-02-13

## 2020-02-13 LAB
ALBUMIN SERPL-MCNC: 3.6 G/DL (ref 3.5–5)
ANION GAP SERPL CALCULATED.3IONS-SCNC: 8 MMOL/L (ref 5–18)
BUN SERPL-MCNC: 28 MG/DL (ref 8–28)
CALCIUM SERPL-MCNC: 9.5 MG/DL (ref 8.5–10.5)
CHLORIDE BLD-SCNC: 106 MMOL/L (ref 98–107)
CO2 SERPL-SCNC: 26 MMOL/L (ref 22–31)
CREAT SERPL-MCNC: 1.14 MG/DL (ref 0.6–1.1)
CREAT UR-MCNC: 78.6 MG/DL
GFR SERPL CREATININE-BSD FRML MDRD: 45 ML/MIN/1.73M2
GLUCOSE BLD-MCNC: 137 MG/DL (ref 70–125)
PHOSPHATE SERPL-MCNC: 3.3 MG/DL (ref 2.5–4.5)
POTASSIUM BLD-SCNC: 4.6 MMOL/L (ref 3.5–5)
PROTEIN, RANDOM URINE - HISTORICAL: 11 MG/DL
PROTEIN/CREAT RATIO, RANDOM UR: 0.14
SODIUM SERPL-SCNC: 140 MMOL/L (ref 136–145)

## 2020-02-25 ENCOUNTER — RECORDS - HEALTHEAST (OUTPATIENT)
Dept: ADMINISTRATIVE | Facility: OTHER | Age: 85
End: 2020-02-25

## 2020-03-05 ENCOUNTER — RECORDS - HEALTHEAST (OUTPATIENT)
Dept: ADMINISTRATIVE | Facility: OTHER | Age: 85
End: 2020-03-05

## 2020-03-23 ENCOUNTER — RECORDS - HEALTHEAST (OUTPATIENT)
Dept: LAB | Facility: CLINIC | Age: 85
End: 2020-03-23

## 2020-03-24 LAB — BACTERIA SPEC CULT: NO GROWTH

## 2020-04-01 ENCOUNTER — OFFICE VISIT (OUTPATIENT)
Dept: PHARMACY | Facility: PHYSICIAN GROUP | Age: 85
End: 2020-04-01

## 2020-04-01 DIAGNOSIS — K21.9 GASTROESOPHAGEAL REFLUX DISEASE, ESOPHAGITIS PRESENCE NOT SPECIFIED: Primary | ICD-10-CM

## 2020-04-01 PROCEDURE — 99207 ZZC NO CHARGE LOS: CPT | Performed by: PHARMACIST

## 2020-04-01 NOTE — PROGRESS NOTES
This patient's medication list and chart were reviewed at the request of Sandra Moore     Recommendations:  1. Recommend decreasing Ranitidine to 150 mg daily based on renal function, or changing to Famotidine 10 mg once daily if unable to obtain ranitidine due to drug recall.    Reason for Chart review:  Dr. Moore would like to confirm if the patients ranitidine and carafate can be taken together considering her renal function.      Assessment:  [GERD]:   Per eCW, pt is taking Carafate 1 g four times daily and Ranitidine 300 mg daily at bedtime.    Carafate is excreted in urine unchanged, and doesn't require any dose adjustments based on renal function.  Ranitidine should be limited to 150 mg every 24 hours if CrCl is below 50 ml/min.  Famotidine should be limited to 20 mg every 24 hours if CrCl is between 30 to 60 ml/min, and 10 mg per 24 hours if below 30 ml/min    eCrCl calculation:  Patient is slightly underweight, BMI 24.  Ideal body weight: 51.25 kg  Adjusted body weight:  55 kg    eCrCl (cockroft gault using IBW): 26 ml/min  eCrCl (cockroft gault using ABW): 30.8 ml/min      3/23/2020 Vitals:     Vitals: Ht 62.5, Wt 133 lb 6 oz, BMI 24.00, Pulse 86, /78, Arm / Cuff size Regular:lt, Initials tc.       RENAL FUNCTION PROFILE - 02/13/2020      NAME VALUE REFERENCE RANGE    ALBUMIN 3.6 3.5-5.0 (g/dL)    CALCIUM 9.5 8.5-10.5 (mg/dL)    PHOSPHORUS 3.3 2.5-4.5 (mg/dL)    GLUCOSE 137 H  (mg/dL)    BLOOD UREA NITROGEN 28 8-28 (mg/dL)    CREATININE 1.14 H 0.60-1.10 (mg/dL)    SODIUM 140 136-145 (mmol/L)    POTASSIUM 4.6 3.5-5.0 (mmol/L)    CHLORIDE 106  (mmol/L)    CO2 26 22-31 (mmol/L)    ANION GAP, CALCULATION 8 5-18 (mmol/L)    GFR MDRD AF AMER 54 L >60 (mL/min/1.73m2)    GFR MDRD NON AF AMER 45 L >60 (mL/min/1.73m2)       Brooklyn Armstrong, PharmD  Medication Therapy Management Pharmacist  Pager: [phone]

## 2020-04-14 ENCOUNTER — RECORDS - HEALTHEAST (OUTPATIENT)
Dept: LAB | Facility: CLINIC | Age: 85
End: 2020-04-14

## 2020-04-14 LAB
ALBUMIN UR-MCNC: NEGATIVE MG/DL
APPEARANCE UR: CLEAR
BILIRUB UR QL STRIP: NEGATIVE
COLOR UR AUTO: YELLOW
GLUCOSE UR STRIP-MCNC: NEGATIVE MG/DL
HGB UR QL STRIP: NEGATIVE
KETONES UR STRIP-MCNC: NEGATIVE MG/DL
LEUKOCYTE ESTERASE UR QL STRIP: NEGATIVE
NITRATE UR QL: NEGATIVE
PH UR STRIP: 7.5 [PH] (ref 4.5–8)
SP GR UR STRIP: 1.01 (ref 1–1.03)
UROBILINOGEN UR STRIP-ACNC: NORMAL

## 2020-04-15 LAB — BACTERIA SPEC CULT: NO GROWTH

## 2020-05-04 ENCOUNTER — RECORDS - HEALTHEAST (OUTPATIENT)
Dept: LAB | Facility: CLINIC | Age: 85
End: 2020-05-04

## 2020-05-04 LAB
ALBUMIN UR-MCNC: NEGATIVE MG/DL
APPEARANCE UR: CLEAR
BACTERIA #/AREA URNS HPF: ABNORMAL HPF
BILIRUB UR QL STRIP: NEGATIVE
COLOR UR AUTO: YELLOW
GLUCOSE UR STRIP-MCNC: NEGATIVE MG/DL
HGB UR QL STRIP: NEGATIVE
KETONES UR STRIP-MCNC: NEGATIVE MG/DL
LEUKOCYTE ESTERASE UR QL STRIP: ABNORMAL
NITRATE UR QL: NEGATIVE
PH UR STRIP: 6.5 [PH] (ref 4.5–8)
RBC #/AREA URNS AUTO: ABNORMAL HPF
RENAL EPI CELLS #/AREA URNS HPF: ABNORMAL LPF
SP GR UR STRIP: 1.01 (ref 1–1.03)
SQUAMOUS #/AREA URNS AUTO: ABNORMAL LPF
TRANS CELLS #/AREA URNS HPF: ABNORMAL LPF
UROBILINOGEN UR STRIP-ACNC: ABNORMAL
WBC #/AREA URNS AUTO: ABNORMAL HPF

## 2020-05-05 ENCOUNTER — AMBULATORY - HEALTHEAST (OUTPATIENT)
Dept: SURGERY | Facility: HOSPITAL | Age: 85
End: 2020-05-05

## 2020-05-05 DIAGNOSIS — Z11.59 ENCOUNTER FOR SCREENING FOR OTHER VIRAL DISEASES: ICD-10-CM

## 2020-05-05 LAB — BACTERIA SPEC CULT: NO GROWTH

## 2020-05-06 ENCOUNTER — RECORDS - HEALTHEAST (OUTPATIENT)
Dept: LAB | Facility: CLINIC | Age: 85
End: 2020-05-06

## 2020-05-06 LAB
ANION GAP SERPL CALCULATED.3IONS-SCNC: 7 MMOL/L (ref 5–18)
BUN SERPL-MCNC: 23 MG/DL (ref 8–28)
CALCIUM SERPL-MCNC: 9.7 MG/DL (ref 8.5–10.5)
CHLORIDE BLD-SCNC: 101 MMOL/L (ref 98–107)
CO2 SERPL-SCNC: 28 MMOL/L (ref 22–31)
CREAT SERPL-MCNC: 1.38 MG/DL (ref 0.6–1.1)
GFR SERPL CREATININE-BSD FRML MDRD: 36 ML/MIN/1.73M2
GLUCOSE BLD-MCNC: 123 MG/DL (ref 70–125)
POTASSIUM BLD-SCNC: 5.1 MMOL/L (ref 3.5–5)
SODIUM SERPL-SCNC: 136 MMOL/L (ref 136–145)

## 2020-06-09 ENCOUNTER — RECORDS - HEALTHEAST (OUTPATIENT)
Dept: LAB | Facility: CLINIC | Age: 85
End: 2020-06-09

## 2020-06-09 LAB
ALBUMIN UR-MCNC: NEGATIVE MG/DL
APPEARANCE UR: CLEAR
BILIRUB UR QL STRIP: NEGATIVE
COLOR UR AUTO: YELLOW
GLUCOSE UR STRIP-MCNC: NEGATIVE MG/DL
HGB UR QL STRIP: NEGATIVE
KETONES UR STRIP-MCNC: NEGATIVE MG/DL
LEUKOCYTE ESTERASE UR QL STRIP: NEGATIVE
NITRATE UR QL: NEGATIVE
PH UR STRIP: 5.5 [PH] (ref 4.5–8)
SP GR UR STRIP: 1.01 (ref 1–1.03)
UROBILINOGEN UR STRIP-ACNC: NORMAL

## 2020-06-10 LAB — BACTERIA SPEC CULT: NO GROWTH

## 2020-06-12 ENCOUNTER — RECORDS - HEALTHEAST (OUTPATIENT)
Dept: LAB | Facility: CLINIC | Age: 85
End: 2020-06-12

## 2020-06-12 LAB — TSH SERPL DL<=0.005 MIU/L-ACNC: 3.86 UIU/ML (ref 0.3–5)

## 2020-06-18 ENCOUNTER — RECORDS - HEALTHEAST (OUTPATIENT)
Dept: LAB | Facility: CLINIC | Age: 85
End: 2020-06-18

## 2020-06-18 LAB
ALBUMIN SERPL-MCNC: 3.2 G/DL (ref 3.5–5)
ANION GAP SERPL CALCULATED.3IONS-SCNC: 8 MMOL/L (ref 5–18)
BUN SERPL-MCNC: 19 MG/DL (ref 8–28)
CALCIUM SERPL-MCNC: 9.4 MG/DL (ref 8.5–10.5)
CHLORIDE BLD-SCNC: 108 MMOL/L (ref 98–107)
CO2 SERPL-SCNC: 24 MMOL/L (ref 22–31)
CREAT SERPL-MCNC: 1.16 MG/DL (ref 0.6–1.1)
GFR SERPL CREATININE-BSD FRML MDRD: 44 ML/MIN/1.73M2
GLUCOSE BLD-MCNC: 137 MG/DL (ref 70–125)
HGB BLD-MCNC: 12.9 G/DL (ref 12–16)
PHOSPHATE SERPL-MCNC: 3.2 MG/DL (ref 2.5–4.5)
POTASSIUM BLD-SCNC: 4.4 MMOL/L (ref 3.5–5)
SODIUM SERPL-SCNC: 140 MMOL/L (ref 136–145)

## 2020-06-29 ENCOUNTER — RECORDS - HEALTHEAST (OUTPATIENT)
Dept: LAB | Facility: CLINIC | Age: 85
End: 2020-06-29

## 2020-06-30 LAB — BACTERIA SPEC CULT: NO GROWTH

## 2020-07-15 ENCOUNTER — RECORDS - HEALTHEAST (OUTPATIENT)
Dept: LAB | Facility: CLINIC | Age: 85
End: 2020-07-15

## 2020-07-18 LAB — BACTERIA SPEC CULT: ABNORMAL

## 2020-08-05 ENCOUNTER — RECORDS - HEALTHEAST (OUTPATIENT)
Dept: LAB | Facility: CLINIC | Age: 85
End: 2020-08-05

## 2020-08-06 LAB — BACTERIA SPEC CULT: NO GROWTH

## 2020-08-14 ENCOUNTER — RECORDS - HEALTHEAST (OUTPATIENT)
Dept: LAB | Facility: CLINIC | Age: 85
End: 2020-08-14

## 2020-08-14 LAB — TSH SERPL DL<=0.005 MIU/L-ACNC: 4.07 UIU/ML (ref 0.3–5)

## 2020-09-28 ENCOUNTER — RECORDS - HEALTHEAST (OUTPATIENT)
Dept: LAB | Facility: CLINIC | Age: 85
End: 2020-09-28

## 2020-09-30 LAB — BACTERIA SPEC CULT: ABNORMAL

## 2020-10-12 ENCOUNTER — RECORDS - HEALTHEAST (OUTPATIENT)
Dept: LAB | Facility: CLINIC | Age: 85
End: 2020-10-12

## 2020-10-13 ENCOUNTER — RECORDS - HEALTHEAST (OUTPATIENT)
Dept: LAB | Facility: CLINIC | Age: 85
End: 2020-10-13

## 2020-10-13 LAB
ALBUMIN SERPL-MCNC: 3.7 G/DL (ref 3.5–5)
ALBUMIN UR-MCNC: NEGATIVE MG/DL
ANION GAP SERPL CALCULATED.3IONS-SCNC: 9 MMOL/L (ref 5–18)
APPEARANCE UR: CLEAR
BILIRUB UR QL STRIP: NEGATIVE
BUN SERPL-MCNC: 22 MG/DL (ref 8–28)
CALCIUM SERPL-MCNC: 9.5 MG/DL (ref 8.5–10.5)
CHLORIDE BLD-SCNC: 104 MMOL/L (ref 98–107)
CO2 SERPL-SCNC: 25 MMOL/L (ref 22–31)
COLOR UR AUTO: NORMAL
CREAT SERPL-MCNC: 1.05 MG/DL (ref 0.6–1.1)
CREAT UR-MCNC: 26.9 MG/DL
GFR SERPL CREATININE-BSD FRML MDRD: 49 ML/MIN/1.73M2
GLUCOSE BLD-MCNC: 92 MG/DL (ref 70–125)
GLUCOSE UR STRIP-MCNC: NEGATIVE MG/DL
HGB BLD-MCNC: 13.4 G/DL (ref 12–16)
HGB UR QL STRIP: NEGATIVE
KETONES UR STRIP-MCNC: NEGATIVE MG/DL
LEUKOCYTE ESTERASE UR QL STRIP: NEGATIVE
NITRATE UR QL: NEGATIVE
PH UR STRIP: 7.5 [PH] (ref 4.5–8)
PHOSPHATE SERPL-MCNC: 3.4 MG/DL (ref 2.5–4.5)
POTASSIUM BLD-SCNC: 5.2 MMOL/L (ref 3.5–5)
PROTEIN, RANDOM URINE - HISTORICAL: <7 MG/DL
PROTEIN/CREAT RATIO, RANDOM UR: NORMAL
PTH-INTACT SERPL-MCNC: 75 PG/ML (ref 10–86)
SODIUM SERPL-SCNC: 138 MMOL/L (ref 136–145)
SP GR UR STRIP: 1.01 (ref 1–1.03)
UROBILINOGEN UR STRIP-ACNC: NORMAL

## 2020-12-08 ENCOUNTER — RECORDS - HEALTHEAST (OUTPATIENT)
Dept: LAB | Facility: CLINIC | Age: 85
End: 2020-12-08

## 2020-12-09 LAB — TSH SERPL DL<=0.005 MIU/L-ACNC: 0.52 UIU/ML (ref 0.3–5)

## 2020-12-14 ENCOUNTER — RECORDS - HEALTHEAST (OUTPATIENT)
Dept: LAB | Facility: CLINIC | Age: 85
End: 2020-12-14

## 2020-12-14 LAB
ALBUMIN UR-MCNC: ABNORMAL MG/DL
APPEARANCE UR: ABNORMAL
BACTERIA #/AREA URNS HPF: ABNORMAL HPF
BILIRUB UR QL STRIP: NEGATIVE
CAOX CRY #/AREA URNS HPF: PRESENT /[HPF]
COLOR UR AUTO: YELLOW
GLUCOSE UR STRIP-MCNC: NEGATIVE MG/DL
HGB UR QL STRIP: NEGATIVE
KETONES UR STRIP-MCNC: NEGATIVE MG/DL
LEUKOCYTE ESTERASE UR QL STRIP: ABNORMAL
MUCOUS THREADS #/AREA URNS LPF: ABNORMAL LPF
NITRATE UR QL: NEGATIVE
PH UR STRIP: 6.5 [PH] (ref 4.5–8)
RBC #/AREA URNS AUTO: ABNORMAL HPF
SP GR UR STRIP: 1.01 (ref 1–1.03)
SQUAMOUS #/AREA URNS AUTO: ABNORMAL LPF
TRANS CELLS #/AREA URNS HPF: ABNORMAL LPF
UROBILINOGEN UR STRIP-ACNC: ABNORMAL
WBC #/AREA URNS AUTO: ABNORMAL HPF
WBC CLUMPS #/AREA URNS HPF: PRESENT /[HPF]

## 2020-12-15 LAB — BACTERIA SPEC CULT: NO GROWTH

## 2021-01-30 ENCOUNTER — RECORDS - HEALTHEAST (OUTPATIENT)
Dept: LAB | Facility: CLINIC | Age: 86
End: 2021-01-30

## 2021-02-01 ENCOUNTER — RECORDS - HEALTHEAST (OUTPATIENT)
Dept: LAB | Facility: CLINIC | Age: 86
End: 2021-02-01

## 2021-02-01 LAB
ALBUMIN SERPL-MCNC: 3.4 G/DL (ref 3.5–5)
ALBUMIN UR-MCNC: NEGATIVE MG/DL
ANION GAP SERPL CALCULATED.3IONS-SCNC: 8 MMOL/L (ref 5–18)
APPEARANCE UR: CLEAR
BACTERIA #/AREA URNS HPF: ABNORMAL HPF
BILIRUB UR QL STRIP: NEGATIVE
BUN SERPL-MCNC: 25 MG/DL (ref 8–28)
CALCIUM SERPL-MCNC: 9.5 MG/DL (ref 8.5–10.5)
CHLORIDE BLD-SCNC: 102 MMOL/L (ref 98–107)
CO2 SERPL-SCNC: 27 MMOL/L (ref 22–31)
COLOR UR AUTO: ABNORMAL
CREAT SERPL-MCNC: 1.12 MG/DL (ref 0.6–1.1)
CREAT UR-MCNC: 17.8 MG/DL
GFR SERPL CREATININE-BSD FRML MDRD: 46 ML/MIN/1.73M2
GLUCOSE BLD-MCNC: 117 MG/DL (ref 70–125)
GLUCOSE UR STRIP-MCNC: NEGATIVE MG/DL
HBA1C MFR BLD: 5.6 %
HGB BLD-MCNC: 12.6 G/DL (ref 12–16)
HGB UR QL STRIP: NEGATIVE
KETONES UR STRIP-MCNC: NEGATIVE MG/DL
LEUKOCYTE ESTERASE UR QL STRIP: ABNORMAL
NITRATE UR QL: NEGATIVE
PH UR STRIP: 7.5 [PH] (ref 4.5–8)
PHOSPHATE SERPL-MCNC: 3.3 MG/DL (ref 2.5–4.5)
POTASSIUM BLD-SCNC: 5.6 MMOL/L (ref 3.5–5)
PROTEIN, RANDOM URINE - HISTORICAL: <7 MG/DL
PROTEIN/CREAT RATIO, RANDOM UR: NORMAL
PTH-INTACT SERPL-MCNC: 101 PG/ML (ref 10–86)
RBC #/AREA URNS AUTO: ABNORMAL HPF
SODIUM SERPL-SCNC: 137 MMOL/L (ref 136–145)
SP GR UR STRIP: 1.01 (ref 1–1.03)
SQUAMOUS #/AREA URNS AUTO: ABNORMAL LPF
UROBILINOGEN UR STRIP-ACNC: ABNORMAL
WBC #/AREA URNS AUTO: ABNORMAL HPF

## 2021-02-03 LAB — BACTERIA SPEC CULT: ABNORMAL

## 2021-02-11 ENCOUNTER — RECORDS - HEALTHEAST (OUTPATIENT)
Dept: LAB | Facility: CLINIC | Age: 86
End: 2021-02-11

## 2021-02-12 LAB
ALBUMIN SERPL-MCNC: 3.5 G/DL (ref 3.5–5)
ANION GAP SERPL CALCULATED.3IONS-SCNC: 8 MMOL/L (ref 5–18)
BUN SERPL-MCNC: 31 MG/DL (ref 8–28)
CALCIUM SERPL-MCNC: 9.2 MG/DL (ref 8.5–10.5)
CHLORIDE BLD-SCNC: 103 MMOL/L (ref 98–107)
CO2 SERPL-SCNC: 26 MMOL/L (ref 22–31)
CREAT SERPL-MCNC: 1.2 MG/DL (ref 0.6–1.1)
GFR SERPL CREATININE-BSD FRML MDRD: 42 ML/MIN/1.73M2
GLUCOSE BLD-MCNC: 133 MG/DL (ref 70–125)
PHOSPHATE SERPL-MCNC: 3.6 MG/DL (ref 2.5–4.5)
POTASSIUM BLD-SCNC: 4.4 MMOL/L (ref 3.5–5)
SODIUM SERPL-SCNC: 137 MMOL/L (ref 136–145)

## 2021-03-02 ENCOUNTER — RECORDS - HEALTHEAST (OUTPATIENT)
Dept: LAB | Facility: CLINIC | Age: 86
End: 2021-03-02

## 2021-03-02 LAB
ALBUMIN UR-MCNC: NEGATIVE MG/DL
APPEARANCE UR: CLEAR
BACTERIA #/AREA URNS HPF: ABNORMAL HPF
BILIRUB UR QL STRIP: NEGATIVE
COLOR UR AUTO: YELLOW
GLUCOSE UR STRIP-MCNC: NEGATIVE MG/DL
HGB UR QL STRIP: NEGATIVE
KETONES UR STRIP-MCNC: NEGATIVE MG/DL
LEUKOCYTE ESTERASE UR QL STRIP: ABNORMAL
NITRATE UR QL: NEGATIVE
PH UR STRIP: 7 [PH] (ref 4.5–8)
RBC #/AREA URNS AUTO: ABNORMAL HPF
SP GR UR STRIP: 1.01 (ref 1–1.03)
SQUAMOUS #/AREA URNS AUTO: ABNORMAL LPF
UROBILINOGEN UR STRIP-ACNC: ABNORMAL
WBC #/AREA URNS AUTO: ABNORMAL HPF

## 2021-03-05 LAB — BACTERIA SPEC CULT: ABNORMAL

## 2021-03-15 ENCOUNTER — AMBULATORY - HEALTHEAST (OUTPATIENT)
Dept: ADMINISTRATIVE | Facility: CLINIC | Age: 86
End: 2021-03-15

## 2021-03-15 DIAGNOSIS — K43.9 VENTRAL HERNIA: ICD-10-CM

## 2021-03-31 ENCOUNTER — RECORDS - HEALTHEAST (OUTPATIENT)
Dept: LAB | Facility: CLINIC | Age: 86
End: 2021-03-31

## 2021-04-02 LAB — BACTERIA SPEC CULT: ABNORMAL

## 2021-05-12 ENCOUNTER — RECORDS - HEALTHEAST (OUTPATIENT)
Dept: LAB | Facility: CLINIC | Age: 86
End: 2021-05-12

## 2021-05-13 LAB
25(OH)D3 SERPL-MCNC: 24.4 NG/ML (ref 30–80)
ANION GAP SERPL CALCULATED.3IONS-SCNC: 10 MMOL/L (ref 5–18)
BASOPHILS # BLD AUTO: 0 THOU/UL (ref 0–0.2)
BASOPHILS NFR BLD AUTO: 1 % (ref 0–2)
BUN SERPL-MCNC: 20 MG/DL (ref 8–28)
CALCIUM SERPL-MCNC: 9.4 MG/DL (ref 8.5–10.5)
CHLORIDE BLD-SCNC: 102 MMOL/L (ref 98–107)
CHOLEST SERPL-MCNC: 177 MG/DL
CO2 SERPL-SCNC: 26 MMOL/L (ref 22–31)
CREAT SERPL-MCNC: 1.11 MG/DL (ref 0.6–1.1)
EOSINOPHIL # BLD AUTO: 0.1 THOU/UL (ref 0–0.4)
EOSINOPHIL NFR BLD AUTO: 2 % (ref 0–6)
ERYTHROCYTE [DISTWIDTH] IN BLOOD BY AUTOMATED COUNT: 13 % (ref 11–14.5)
FASTING STATUS PATIENT QL REPORTED: NORMAL
GFR SERPL CREATININE-BSD FRML MDRD: 46 ML/MIN/1.73M2
GLUCOSE BLD-MCNC: 109 MG/DL (ref 70–125)
HCT VFR BLD AUTO: 43 % (ref 35–47)
HDLC SERPL-MCNC: 71 MG/DL
HGB BLD-MCNC: 13.5 G/DL (ref 12–16)
IMM GRANULOCYTES # BLD: 0 THOU/UL
IMM GRANULOCYTES NFR BLD: 0 %
LDLC SERPL CALC-MCNC: 92 MG/DL
LYMPHOCYTES # BLD AUTO: 0.9 THOU/UL (ref 0.8–4.4)
LYMPHOCYTES NFR BLD AUTO: 22 % (ref 20–40)
MCH RBC QN AUTO: 31.6 PG (ref 27–34)
MCHC RBC AUTO-ENTMCNC: 31.4 G/DL (ref 32–36)
MCV RBC AUTO: 101 FL (ref 80–100)
MONOCYTES # BLD AUTO: 0.4 THOU/UL (ref 0–0.9)
MONOCYTES NFR BLD AUTO: 10 % (ref 2–10)
NEUTROPHILS # BLD AUTO: 2.9 THOU/UL (ref 2–7.7)
NEUTROPHILS NFR BLD AUTO: 66 % (ref 50–70)
PLATELET # BLD AUTO: 260 THOU/UL (ref 140–440)
PMV BLD AUTO: 10.2 FL (ref 8.5–12.5)
POTASSIUM BLD-SCNC: 4.7 MMOL/L (ref 3.5–5)
RBC # BLD AUTO: 4.27 MILL/UL (ref 3.8–5.4)
SODIUM SERPL-SCNC: 138 MMOL/L (ref 136–145)
TRIGL SERPL-MCNC: 72 MG/DL
TSH SERPL DL<=0.005 MIU/L-ACNC: 11.98 UIU/ML (ref 0.3–5)
VIT B12 SERPL-MCNC: 295 PG/ML (ref 213–816)
WBC: 4.3 THOU/UL (ref 4–11)

## 2021-05-25 ENCOUNTER — RECORDS - HEALTHEAST (OUTPATIENT)
Dept: ADMINISTRATIVE | Facility: CLINIC | Age: 86
End: 2021-05-25

## 2021-05-27 ENCOUNTER — RECORDS - HEALTHEAST (OUTPATIENT)
Dept: ADMINISTRATIVE | Facility: CLINIC | Age: 86
End: 2021-05-27

## 2021-05-28 ENCOUNTER — RECORDS - HEALTHEAST (OUTPATIENT)
Dept: ADMINISTRATIVE | Facility: CLINIC | Age: 86
End: 2021-05-28

## 2021-05-29 NOTE — ANESTHESIA CARE TRANSFER NOTE
Patient transferred to bed. Taken to [facility] Phase II on O2 at 3L via NC.  In Phase II vital signs stable, SBAR report to RN per institutional policy and procedure  Transfer of care.    Last vitals:   Vitals:    06/05/19 1020   BP: 123/56   Pulse: 66   Resp: 14   Temp: 36.6  C (97.9  F)   SpO2: 100%     Patient's level of consciousness is awake  Spontaneous respirations: yes  Maintains airway independently: yes  Dentition unchanged: yes  Oropharynx: oropharynx clear of all foreign objects    QCDR Measures:  ASA# 20 - Surgical Safety Checklist: WHO surgical safety checklist completed prior to induction    PQRS# 430 - Adult PONV Prevention: NA - Not adult patient, not GA or 3 or more risk factors NOT present  ASA# 8 - Peds PONV Prevention: NA - Not pediatric patient, not GA or 2 or more risk factors NOT present  PQRS# 424 - Petra-op Temp Management: 4559F - At least one body temp DOCUMENTED => 35.5C or 95.9F within required timeframe  PQRS# 426 - PACU Transfer Protocol: - Transfer of care checklist used  ASA# 14 - Acute Post-op Pain: ASA14B - Patient did NOT experience pain >= 7 out of 10

## 2021-05-29 NOTE — ANESTHESIA PREPROCEDURE EVALUATION
Anesthesia Evaluation      Patient summary reviewed     Airway   Mallampati: II  Neck ROM: limited   Pulmonary - normal exam                          Cardiovascular   (+) hypertension, dysrhythmias, ,     Rhythm: regular  Rate: normal,         Neuro/Psych    (+) neuromuscular disease,      Endo/Other    (+) diabetes mellitus,      GI/Hepatic/Renal    (+) GERD,   chronic renal disease,      Other findings:     Acute renal injury (H)  Acute pyelonephritis  Diabetes (H)  Essential hypertension with goal blood pressure less than 140/90  Hyperlipidemia  Anemia  TIKA (acute kidney injury) (H)  Leukocytosis  Anuria and oliguria  Metabolic acidosis, normal anion gap (NAG)  Hyponatremia  Hypomagnesemia  UTI (urinary tract infection)  Chest tightness  Atypical chest pain  Right bundle branch block  Diet-controlled diabetes mellitus (H)  Acute kidney injury (H)  Moderate malnutrition (H)  Gastroesophageal reflux disease without esophagit          Dental - normal exam                        Anesthesia Plan  Planned anesthetic: MAC    ASA 2     Anesthetic plan and risks discussed with: patient  Anesthesia plan special considerations: antiemetics,   Post-op plan: routine recovery

## 2021-05-29 NOTE — ANESTHESIA POSTPROCEDURE EVALUATION
Patient: Latricia Davis  ESOPHAGOGASTRODUODENOSCOPY (EGD)  Anesthesia type: MAC    Patient location: PACU  Last vitals:   Vitals Value Taken Time   /63 6/5/2019 10:30 AM   Temp 36.6  C (97.9  F) 6/5/2019 10:20 AM   Pulse 60 6/5/2019 10:30 AM   Resp 15 6/5/2019 10:30 AM   SpO2 99 % 6/5/2019 10:30 AM     Post vital signs: stable  Level of consciousness: awake and responds to simple questions  Post-anesthesia pain: pain controlled  Post-anesthesia nausea and vomiting: no  Pulmonary: unassisted, return to baseline  Cardiovascular: stable and blood pressure at baseline  Hydration: adequate  Anesthetic events: no    QCDR Measures:  ASA# 11 - Petra-op Cardiac Arrest: ASA11B - Patient did NOT experience unanticipated cardiac arrest  ASA# 12 - Petra-op Mortality Rate: ASA12B - Patient did NOT die  ASA# 13 - PACU Re-Intubation Rate: NA - No ETT / LMA used for case  ASA# 10 - Composite Anes Safety: ASA10A - No serious adverse event    Additional Notes:

## 2021-05-31 ENCOUNTER — RECORDS - HEALTHEAST (OUTPATIENT)
Dept: LAB | Facility: CLINIC | Age: 86
End: 2021-05-31

## 2021-06-02 ENCOUNTER — RECORDS - HEALTHEAST (OUTPATIENT)
Dept: ADMINISTRATIVE | Facility: CLINIC | Age: 86
End: 2021-06-02

## 2021-06-02 ENCOUNTER — RECORDS - HEALTHEAST (OUTPATIENT)
Dept: LAB | Facility: CLINIC | Age: 86
End: 2021-06-02

## 2021-06-02 VITALS — BODY MASS INDEX: 22.88 KG/M2 | BODY MASS INDEX: 22.88 KG/M2 | HEIGHT: 63 IN | HEIGHT: 63 IN

## 2021-06-02 LAB
ALBUMIN SERPL-MCNC: 3.7 G/DL (ref 3.5–5)
ANION GAP SERPL CALCULATED.3IONS-SCNC: 13 MMOL/L (ref 5–18)
BUN SERPL-MCNC: 21 MG/DL (ref 8–28)
CALCIUM SERPL-MCNC: 9.5 MG/DL (ref 8.5–10.5)
CHLORIDE BLD-SCNC: 100 MMOL/L (ref 98–107)
CO2 SERPL-SCNC: 21 MMOL/L (ref 22–31)
CREAT SERPL-MCNC: 1.08 MG/DL (ref 0.6–1.1)
GFR SERPL CREATININE-BSD FRML MDRD: 47 ML/MIN/1.73M2
GLUCOSE BLD-MCNC: 120 MG/DL (ref 70–125)
HGB BLD-MCNC: 13.6 G/DL (ref 12–16)
PHOSPHATE SERPL-MCNC: 3 MG/DL (ref 2.5–4.5)
POTASSIUM BLD-SCNC: 4.8 MMOL/L (ref 3.5–5)
PTH-INTACT SERPL-MCNC: 85 PG/ML (ref 10–86)
SODIUM SERPL-SCNC: 134 MMOL/L (ref 136–145)

## 2021-06-03 VITALS — WEIGHT: 125 LBS | BODY MASS INDEX: 23 KG/M2 | HEIGHT: 62 IN

## 2021-06-03 LAB
25(OH)D3 SERPL-MCNC: 24 NG/ML (ref 30–80)
T4 FREE SERPL-MCNC: 1 NG/DL (ref 0.7–1.8)
TSH SERPL DL<=0.005 MIU/L-ACNC: 9.96 UIU/ML (ref 0.3–5)

## 2021-06-04 LAB — BACTERIA SPEC CULT: ABNORMAL

## 2021-06-15 ENCOUNTER — RECORDS - HEALTHEAST (OUTPATIENT)
Dept: LAB | Facility: CLINIC | Age: 86
End: 2021-06-15

## 2021-06-15 LAB
ALBUMIN UR-MCNC: NEGATIVE G/DL
APPEARANCE UR: CLEAR
BILIRUB UR QL STRIP: NEGATIVE
COLOR UR AUTO: COLORLESS
GLUCOSE UR STRIP-MCNC: NEGATIVE MG/DL
HGB UR QL STRIP: NEGATIVE
KETONES UR STRIP-MCNC: NEGATIVE MG/DL
LEUKOCYTE ESTERASE UR QL STRIP: NEGATIVE
NITRATE UR QL: NEGATIVE
PH UR STRIP: 7.5 [PH] (ref 5–8)
SP GR UR STRIP: 1.01 (ref 1–1.03)
UROBILINOGEN UR STRIP-ACNC: NORMAL

## 2021-06-16 PROBLEM — N17.9 ACUTE KIDNEY INJURY (H): Status: ACTIVE | Noted: 2018-12-21

## 2021-06-16 LAB — BACTERIA SPEC CULT: NO GROWTH

## 2021-06-17 ENCOUNTER — RECORDS - HEALTHEAST (OUTPATIENT)
Dept: LAB | Facility: CLINIC | Age: 86
End: 2021-06-17

## 2021-06-17 LAB
SARS-COV-2 PCR COMMENT: NORMAL
SARS-COV-2 RNA SPEC QL NAA+PROBE: NEGATIVE
SARS-COV-2 VIRUS SPECIMEN SOURCE: NORMAL

## 2021-06-29 ENCOUNTER — RECORDS - HEALTHEAST (OUTPATIENT)
Dept: LAB | Facility: CLINIC | Age: 86
End: 2021-06-29

## 2021-06-30 LAB
25(OH)D3 SERPL-MCNC: 24.1 NG/ML (ref 30–80)
CHOLEST SERPL-MCNC: 181 MG/DL
FASTING STATUS PATIENT QL REPORTED: NORMAL
HDLC SERPL-MCNC: 68 MG/DL
LDLC SERPL CALC-MCNC: 95 MG/DL
POTASSIUM BLD-SCNC: 4.2 MMOL/L (ref 3.5–5)
TRIGL SERPL-MCNC: 91 MG/DL
TSH SERPL DL<=0.005 MIU/L-ACNC: 6.46 UIU/ML (ref 0.3–5)

## 2021-07-22 ENCOUNTER — LAB REQUISITION (OUTPATIENT)
Dept: LAB | Facility: CLINIC | Age: 86
End: 2021-07-22
Payer: COMMERCIAL

## 2021-07-22 DIAGNOSIS — E03.9 HYPOTHYROIDISM, UNSPECIFIED: ICD-10-CM

## 2021-07-22 DIAGNOSIS — E89.0 POSTPROCEDURAL HYPOTHYROIDISM: ICD-10-CM

## 2021-07-23 LAB — TSH SERPL DL<=0.005 MIU/L-ACNC: 2.75 UIU/ML (ref 0.3–5)

## 2021-07-23 PROCEDURE — 36415 COLL VENOUS BLD VENIPUNCTURE: CPT | Mod: ORL | Performed by: INTERNAL MEDICINE

## 2021-07-23 PROCEDURE — P9604 ONE-WAY ALLOW PRORATED TRIP: HCPCS | Mod: ORL | Performed by: INTERNAL MEDICINE

## 2021-07-23 PROCEDURE — 84443 ASSAY THYROID STIM HORMONE: CPT | Mod: ORL | Performed by: INTERNAL MEDICINE

## 2021-07-30 ENCOUNTER — APPOINTMENT (OUTPATIENT)
Dept: CT IMAGING | Facility: HOSPITAL | Age: 86
End: 2021-07-30
Attending: STUDENT IN AN ORGANIZED HEALTH CARE EDUCATION/TRAINING PROGRAM
Payer: COMMERCIAL

## 2021-07-30 ENCOUNTER — HOSPITAL ENCOUNTER (EMERGENCY)
Facility: HOSPITAL | Age: 86
Discharge: HOME | End: 2021-07-30
Attending: STUDENT IN AN ORGANIZED HEALTH CARE EDUCATION/TRAINING PROGRAM | Admitting: STUDENT IN AN ORGANIZED HEALTH CARE EDUCATION/TRAINING PROGRAM
Payer: COMMERCIAL

## 2021-07-30 ENCOUNTER — APPOINTMENT (OUTPATIENT)
Dept: RADIOLOGY | Facility: HOSPITAL | Age: 86
End: 2021-07-30
Attending: STUDENT IN AN ORGANIZED HEALTH CARE EDUCATION/TRAINING PROGRAM
Payer: COMMERCIAL

## 2021-07-30 VITALS
RESPIRATION RATE: 25 BRPM | BODY MASS INDEX: 22.32 KG/M2 | DIASTOLIC BLOOD PRESSURE: 71 MMHG | HEART RATE: 75 BPM | TEMPERATURE: 98 F | HEIGHT: 63 IN | SYSTOLIC BLOOD PRESSURE: 134 MMHG | WEIGHT: 126 LBS | OXYGEN SATURATION: 100 %

## 2021-07-30 DIAGNOSIS — R14.0 BLOATING: ICD-10-CM

## 2021-07-30 LAB
ALBUMIN SERPL-MCNC: 3.5 G/DL (ref 3.5–5)
ALBUMIN UR-MCNC: NEGATIVE MG/DL
ALP SERPL-CCNC: 47 U/L (ref 45–120)
ALT SERPL W P-5'-P-CCNC: <9 U/L (ref 0–45)
ANION GAP SERPL CALCULATED.3IONS-SCNC: 7 MMOL/L (ref 5–18)
APPEARANCE UR: CLEAR
AST SERPL W P-5'-P-CCNC: 13 U/L (ref 0–40)
BASOPHILS # BLD AUTO: 0 10E3/UL (ref 0–0.2)
BASOPHILS NFR BLD AUTO: 1 %
BILIRUB SERPL-MCNC: 0.6 MG/DL (ref 0–1)
BILIRUB UR QL STRIP: NEGATIVE
BNP SERPL-MCNC: 37 PG/ML (ref 0–167)
BUN SERPL-MCNC: 45 MG/DL (ref 8–28)
CALCIUM SERPL-MCNC: 9.7 MG/DL (ref 8.5–10.5)
CHLORIDE BLD-SCNC: 105 MMOL/L (ref 98–107)
CO2 SERPL-SCNC: 27 MMOL/L (ref 22–31)
COLOR UR AUTO: COLORLESS
CREAT SERPL-MCNC: 1.15 MG/DL (ref 0.6–1.1)
EOSINOPHIL # BLD AUTO: 0 10E3/UL (ref 0–0.7)
EOSINOPHIL NFR BLD AUTO: 0 %
ERYTHROCYTE [DISTWIDTH] IN BLOOD BY AUTOMATED COUNT: 12.5 % (ref 10–15)
GFR SERPL CREATININE-BSD FRML MDRD: 41 ML/MIN/1.73M2
GLUCOSE BLD-MCNC: 118 MG/DL (ref 70–125)
GLUCOSE UR STRIP-MCNC: NEGATIVE MG/DL
HCT VFR BLD AUTO: 37.5 % (ref 35–47)
HGB BLD-MCNC: 12.2 G/DL (ref 11.7–15.7)
HGB UR QL STRIP: ABNORMAL
HYALINE CASTS: 1 /LPF
IMM GRANULOCYTES # BLD: 0 10E3/UL
IMM GRANULOCYTES NFR BLD: 1 %
KETONES UR STRIP-MCNC: NEGATIVE MG/DL
LEUKOCYTE ESTERASE UR QL STRIP: NEGATIVE
LIPASE SERPL-CCNC: 30 U/L (ref 0–52)
LYMPHOCYTES # BLD AUTO: 0.9 10E3/UL (ref 0.8–5.3)
LYMPHOCYTES NFR BLD AUTO: 17 %
MCH RBC QN AUTO: 31.9 PG (ref 26.5–33)
MCHC RBC AUTO-ENTMCNC: 32.5 G/DL (ref 31.5–36.5)
MCV RBC AUTO: 98 FL (ref 78–100)
MONOCYTES # BLD AUTO: 0.5 10E3/UL (ref 0–1.3)
MONOCYTES NFR BLD AUTO: 10 %
NEUTROPHILS # BLD AUTO: 3.7 10E3/UL (ref 1.6–8.3)
NEUTROPHILS NFR BLD AUTO: 71 %
NITRATE UR QL: NEGATIVE
NRBC # BLD AUTO: 0 10E3/UL
NRBC BLD AUTO-RTO: 0 /100
PH UR STRIP: 7.5 [PH] (ref 5–7)
PLATELET # BLD AUTO: 236 10E3/UL (ref 150–450)
POTASSIUM BLD-SCNC: 4.8 MMOL/L (ref 3.5–5)
PROT SERPL-MCNC: 6.9 G/DL (ref 6–8)
RBC # BLD AUTO: 3.82 10E6/UL (ref 3.8–5.2)
RBC URINE: <1 /HPF
SODIUM SERPL-SCNC: 139 MMOL/L (ref 136–145)
SP GR UR STRIP: 1.02 (ref 1–1.03)
SQUAMOUS EPITHELIAL: <1 /HPF
TROPONIN I SERPL-MCNC: 0.13 NG/ML (ref 0–0.29)
TROPONIN I SERPL-MCNC: 0.13 NG/ML (ref 0–0.29)
UROBILINOGEN UR STRIP-MCNC: <2 MG/DL
WBC # BLD AUTO: 5.2 10E3/UL (ref 4–11)
WBC URINE: <1 /HPF

## 2021-07-30 PROCEDURE — 83880 ASSAY OF NATRIURETIC PEPTIDE: CPT | Performed by: STUDENT IN AN ORGANIZED HEALTH CARE EDUCATION/TRAINING PROGRAM

## 2021-07-30 PROCEDURE — 250N000011 HC RX IP 250 OP 636: Performed by: STUDENT IN AN ORGANIZED HEALTH CARE EDUCATION/TRAINING PROGRAM

## 2021-07-30 PROCEDURE — 85025 COMPLETE CBC W/AUTO DIFF WBC: CPT | Performed by: STUDENT IN AN ORGANIZED HEALTH CARE EDUCATION/TRAINING PROGRAM

## 2021-07-30 PROCEDURE — 82040 ASSAY OF SERUM ALBUMIN: CPT | Performed by: STUDENT IN AN ORGANIZED HEALTH CARE EDUCATION/TRAINING PROGRAM

## 2021-07-30 PROCEDURE — 81001 URINALYSIS AUTO W/SCOPE: CPT | Performed by: STUDENT IN AN ORGANIZED HEALTH CARE EDUCATION/TRAINING PROGRAM

## 2021-07-30 PROCEDURE — 99285 EMERGENCY DEPT VISIT HI MDM: CPT | Mod: 25

## 2021-07-30 PROCEDURE — 36415 COLL VENOUS BLD VENIPUNCTURE: CPT | Performed by: STUDENT IN AN ORGANIZED HEALTH CARE EDUCATION/TRAINING PROGRAM

## 2021-07-30 PROCEDURE — 83690 ASSAY OF LIPASE: CPT | Performed by: STUDENT IN AN ORGANIZED HEALTH CARE EDUCATION/TRAINING PROGRAM

## 2021-07-30 PROCEDURE — 82565 ASSAY OF CREATININE: CPT | Performed by: STUDENT IN AN ORGANIZED HEALTH CARE EDUCATION/TRAINING PROGRAM

## 2021-07-30 PROCEDURE — 71045 X-RAY EXAM CHEST 1 VIEW: CPT

## 2021-07-30 PROCEDURE — 74177 CT ABD & PELVIS W/CONTRAST: CPT

## 2021-07-30 PROCEDURE — 84484 ASSAY OF TROPONIN QUANT: CPT | Performed by: STUDENT IN AN ORGANIZED HEALTH CARE EDUCATION/TRAINING PROGRAM

## 2021-07-30 PROCEDURE — 93005 ELECTROCARDIOGRAM TRACING: CPT | Performed by: STUDENT IN AN ORGANIZED HEALTH CARE EDUCATION/TRAINING PROGRAM

## 2021-07-30 RX ORDER — IOPAMIDOL 755 MG/ML
75 INJECTION, SOLUTION INTRAVASCULAR ONCE
Status: COMPLETED | OUTPATIENT
Start: 2021-07-30 | End: 2021-07-30

## 2021-07-30 RX ADMIN — IOPAMIDOL 75 ML: 755 INJECTION, SOLUTION INTRAVENOUS at 10:33

## 2021-07-30 ASSESSMENT — MIFFLIN-ST. JEOR: SCORE: 950.66

## 2021-07-30 NOTE — ED TRIAGE NOTES
Pt lives in independent living care center. This morning she felt chest pressure and didn't feel normal. Pt stated her heart was racing with sob. Per Emt NSR with BBB. Chest pain free at this time. No N/V. Abdomen is soft. Active Bowel sounds. Pt reported dark stool this morning. Not on blood thinner. Pt has history of abdominal hernia and her main compliant is abdominal discomfort, felt like something is pushing up. Pt wears abdominal binder to support her abdomen.

## 2021-07-30 NOTE — ED NOTES
Bed: JNED-09  Expected date: 7/30/21  Expected time:   Means of arrival: Ambulance  Comments:  89 F  SPF  CP

## 2021-07-30 NOTE — ED PROVIDER NOTES
"  Emergency Department Encounter         FINAL IMPRESSION:  Abdominal bloating        ED COURSE AND MEDICAL DECISION MAKING       ED Course as of Jul 30 1331   Fri Jul 30, 2021   0821 Patient is a 89-year-old history of diabetes, hypertension, hyperlipidemia, here from her nursing home with complaints of shortness of breath.  The story is somewhat ambiguous as when patient arrived she does not really know why she is here.  [facility] nursing home staff called 911 because she was complaining of shortness of breath although states it has gotten better now.  Patient seems to be focusing on abdominal bloating and some discomfort that has been going on for 2 weeks of her abdomen.  States he has a large hernia that pokes out and she wears an abdominal binder over the past week or so.  Denies any chest pain, nausea, headache, abdominal pain or dysuria.  States she had 2 episodes of dark diarrhea this morning however denies any melena.  Has been doing well this week otherwise.  No leg swelling.  Arrival her vitals were stable.  She is alert x4.  Heart and lungs normal.  Abdomen benign.  No signs of incarcerated or strangulated hernia.  Plan for cardiopulmonary work-up due to patient shortness of breath that was reported this morning as well as imaging of her abdomen.      0826 EKG is sinus rhythm with a rate of 74, OH of 202 right bundle branch block present with similar morphology from September 2016.  QTC is 457, no STEMI, ST changes in the inferior leads which again appear similar to 2016.      1142 Recheck patient.  CT showing nonspecific omental stranding.  She has no abdominal pain patient still states that her abdomen feels \"bloated.\"  No vomiting here.  No chest pain or trouble breathing.  Plan for second troponin most likely discharge home with conservative care measures.      8:12 AM Met with patient for initial evaluation and discussed plan for stay in the Emergency Department.   11:29 AM Reevaluated patient.        " "                    At the conclusion of the encounter I discussed the results of all the tests and the disposition. The questions were answered. The patient or family acknowledged understanding and was agreeable with the care plan.                  MEDICATIONS GIVEN IN THE EMERGENCY DEPARTMENT:  Medications   iopamidol (ISOVUE-370) solution 75 mL (75 mLs Intravenous Given 7/30/21 1033)       NEW PRESCRIPTIONS STARTED AT TODAY'S ED VISIT:  New Prescriptions    No medications on file       HPI     Patient information obtained from: patient    Use of Interpretor: N/A     Latriciabakari Davis is a 89 year old female with a pertinent history of acute gastric ulcer, anemia in chronic kidney disease, basal cell carcinoma of face, chronic kidney disease stage 3, type II diabetes, diabetic renal disease, essential hypertension, and GERD who presents to this ED via EMS for evaluation of abdominal pain.    The patient was feeling short of breath and the staff at her apartment home called for EMS. She felt she was having heart palpitations en route. She is currently focused on abdominal pain and \"fullness\" that she has been feeling for the past two weeks. As the day goes on she feels her stomach enlarges and her umbilical hernia pushes out further. She arrived with an abdominal binder on that was causing her discomfort. She had nausea that has since resolved and had diarrhea twice this morning, dark but not black stool. She is also having chills. She denies vomiting, chest pain, or any other complaints.     REVIEW OF SYSTEMS:  Review of Systems   Constitutional: Negative for fever, malaise. Positive for chills.   HEENT: Negative runny nose, sore throat, ear pain, neck pain  Respiratory: Negative for cough, congestion. Positive for shortness of breath.   Cardiovascular: Negative for chest pain, leg edema. Positive for palpitations.   Gastrointestinal: Negative for constipation, vomiting. Positive for abdominal pain (\"fullness\"), " "nausea, and diarrhea. Negative for black stool.   Genitourinary: Negative for dysuria and hematuria.   Integument: Negative for rash, skin breakdown  Neurological: Negative for paresthesias, weakness, headache.  Musculoskeletal: Negative for joint pain, joint swelling      All other systems reviewed and are negative.          MEDICAL HISTORY     Past Medical History:   Diagnosis Date     Arthritis      Complication of anesthesia      Diabetes (H)      Gastroesophageal reflux disease      Hypertension      Renal disease 2014,2016       Past Surgical History:   Procedure Laterality Date     CATARACT EXTRACTION Left      DILATION AND CURETTAGE      twice     DILATION AND CURETTAGE      x 2     OTHER SURGICAL HISTORY Left     CYST REMOVED FROM ABOVE EYE     MS ESOPHAGOGASTRODUODENOSCOPY TRANSORAL DIAGNOSTIC N/A 6/5/2019    Procedure: ESOPHAGOGASTRODUODENOSCOPY (EGD);  Surgeon: Nikkie Moss MD;  [facility];  Service: Gastroenterology       Social History     Tobacco Use     Smoking status: Never Smoker     Smokeless tobacco: Never Used   Substance Use Topics     Alcohol use: Yes     Comment: Alcoholic Drinks/day: rare occasion 2 glass wine per year     Drug use: No       No current outpatient medications on file.          PHYSICAL EXAM     /71   Pulse 78   Temp 98  F (36.7  C)   Resp 12   Ht 1.6 m (5' 3\")   Wt 57.2 kg (126 lb)   SpO2 99%   BMI 22.32 kg/m        PHYSICAL EXAM:     General: Patient appears well, nontoxic, comfortable  HEENT: Moist mucous membranes, no tongue swelling.  No head trauma.  No midline neck pain.  Cardiovascular: Normal rate, normal rhythm, no extremity edema.  No appreciable murmur.  Respiratory: No signs of respiratory distress, lungs are clear to auscultation bilaterally with no wheezes rhonchi or rales.  Abdominal: Soft, nontender, nondistended, no palpable masses, no guarding, no rebound  Musculoskeletal: Full range of motion of joints, no " deformities appreciated.  Neurological: Alert and oriented, grossly neurologically intact.  Psychological: Normal affect and mood.  Integument: No rashes appreciated          RESULTS       Labs Ordered and Resulted from Time of ED Arrival Up to the Time of Departure from the ED   COMPREHENSIVE METABOLIC PANEL - Abnormal; Notable for the following components:       Result Value    Urea Nitrogen 45 (*)     Creatinine 1.15 (*)     GFR Estimate 41 (*)     All other components within normal limits   ROUTINE UA WITH MICROSCOPIC REFLEX TO CULTURE - Abnormal; Notable for the following components:    Blood Urine 0.03 mg/dL (*)     pH Urine 7.5 (*)     All other components within normal limits    Narrative:     Urine Culture not indicated   LIPASE - Normal   TROPONIN I - Normal   B-TYPE NATRIURETIC PEPTIDE ([facility] ONLY) - Normal   TROPONIN I - Normal   CBC WITH PLATELETS AND DIFFERENTIAL   PERIPHERAL IV CATHETER   CARDIAC CONTINUOUS MONITORING   PULSE OXIMETRY NURSING   CBC WITH PLATELETS & DIFFERENTIAL    Narrative:     The following orders were created for panel order CBC with platelets differential.  Procedure                               Abnormality         Status                     ---------                               -----------         ------                     CBC with platelets and d...[[id number]]                      Final result                 Please view results for these tests on the individual orders.       CT Abdomen Pelvis w Contrast   Final Result   IMPRESSION:    1.  Mild diffuse fat stranding throughout the omentum. This is nonspecific and may represent edema or omental infarction. Less likely, the differential diagnosis includes peritoneal carcinoma and peritonitis.   2.  Trace free fluid in the pelvis.   3.  Stable pancreatic cysts measuring up to 0.9 cm. See guidelines below.      REFERENCE:   Revisions of international consensus [place] guidelines for the management of IPMN of the pancreas.  Pancreatology 2017;17(5):738-753.      Largest cyst less than 10 mm: CT or MRI/MRCP in 6 months and then every 2 years if no change.         XR Chest Port 1 View   Final Result   IMPRESSION: Stable asymmetric elevation of the right hemidiaphragm and stable tortuosity of the descending thoracic aorta. No acute findings. The lungs are clear and there are no pleural effusions. Normal heart size.                        PROCEDURES:  Procedures:  Procedures       I, Kimberly Zaragoza am serving as a scribe to document services personally performed by Tomás Davis DO, based on my observations and the provider's statements to me.  I, Tomás Davis DO, attest that Kimberly Mila is acting in a scribe capacity, has observed my performance of the services and has documented them in accordance with my direction.    Tomás Davis DO  Emergency Medicine  [facility] EMERGENCY DEPARTMENT     Tomás Davis DO  07/30/21 0477

## 2021-07-30 NOTE — DISCHARGE INSTRUCTIONS
Please start taking Metamucil daily.    If you abdominal distension starts turning into persistent pain please return to the ER for repeat evaluation. Continue to push oral hydration while having bouts of diarrhea    Continue taking your home medications

## 2021-08-04 LAB
ATRIAL RATE - MUSE: 74 BPM
DIASTOLIC BLOOD PRESSURE - MUSE: 64 MMHG
INTERPRETATION ECG - MUSE: NORMAL
P AXIS - MUSE: 80 DEGREES
PR INTERVAL - MUSE: 202 MS
QRS DURATION - MUSE: 130 MS
QT - MUSE: 412 MS
QTC - MUSE: 457 MS
R AXIS - MUSE: 100 DEGREES
SYSTOLIC BLOOD PRESSURE - MUSE: 135 MMHG
T AXIS - MUSE: 48 DEGREES
VENTRICULAR RATE- MUSE: 74 BPM

## 2021-08-18 ENCOUNTER — LAB REQUISITION (OUTPATIENT)
Dept: LAB | Facility: CLINIC | Age: 86
End: 2021-08-18
Payer: COMMERCIAL

## 2021-08-18 DIAGNOSIS — R30.0 DYSURIA: ICD-10-CM

## 2021-08-18 DIAGNOSIS — R35.0 FREQUENCY OF MICTURITION: ICD-10-CM

## 2021-08-18 LAB
ALBUMIN UR-MCNC: NEGATIVE MG/DL
APPEARANCE UR: CLEAR
BACTERIA #/AREA URNS HPF: ABNORMAL /HPF
BILIRUB UR QL STRIP: NEGATIVE
COLOR UR AUTO: ABNORMAL
GLUCOSE UR STRIP-MCNC: NEGATIVE MG/DL
HGB UR QL STRIP: NEGATIVE
KETONES UR STRIP-MCNC: NEGATIVE MG/DL
LEUKOCYTE ESTERASE UR QL STRIP: ABNORMAL
MUCOUS THREADS #/AREA URNS LPF: PRESENT /LPF
NITRATE UR QL: NEGATIVE
PH UR STRIP: 6 [PH] (ref 5–7)
RBC URINE: 3 /HPF
SP GR UR STRIP: 1.02 (ref 1–1.03)
SQUAMOUS EPITHELIAL: <1 /HPF
TRANSITIONAL EPI: <1 /HPF
UROBILINOGEN UR STRIP-MCNC: <2 MG/DL
WBC URINE: 93 /HPF

## 2021-08-18 PROCEDURE — 81001 URINALYSIS AUTO W/SCOPE: CPT | Mod: ORL | Performed by: REGISTERED NURSE

## 2021-08-18 PROCEDURE — 87086 URINE CULTURE/COLONY COUNT: CPT | Mod: ORL | Performed by: REGISTERED NURSE

## 2021-08-20 LAB — BACTERIA UR CULT: ABNORMAL

## 2021-09-07 ENCOUNTER — LAB REQUISITION (OUTPATIENT)
Dept: LAB | Facility: CLINIC | Age: 86
End: 2021-09-07
Payer: COMMERCIAL

## 2021-09-07 DIAGNOSIS — E89.0 POSTPROCEDURAL HYPOTHYROIDISM: ICD-10-CM

## 2021-09-08 ENCOUNTER — LAB REQUISITION (OUTPATIENT)
Dept: LAB | Facility: CLINIC | Age: 86
End: 2021-09-08
Payer: COMMERCIAL

## 2021-09-08 DIAGNOSIS — E89.0 POSTPROCEDURAL HYPOTHYROIDISM: ICD-10-CM

## 2021-09-08 LAB — TSH SERPL DL<=0.005 MIU/L-ACNC: 4.92 UIU/ML (ref 0.3–5)

## 2021-09-08 PROCEDURE — 36415 COLL VENOUS BLD VENIPUNCTURE: CPT | Mod: ORL | Performed by: INTERNAL MEDICINE

## 2021-09-08 PROCEDURE — P9604 ONE-WAY ALLOW PRORATED TRIP: HCPCS | Mod: ORL | Performed by: INTERNAL MEDICINE

## 2021-09-08 PROCEDURE — 84443 ASSAY THYROID STIM HORMONE: CPT | Mod: ORL | Performed by: INTERNAL MEDICINE

## 2021-10-06 ENCOUNTER — LAB REQUISITION (OUTPATIENT)
Dept: LAB | Facility: CLINIC | Age: 86
End: 2021-10-06
Payer: COMMERCIAL

## 2021-10-06 DIAGNOSIS — R30.0 DYSURIA: ICD-10-CM

## 2021-10-06 DIAGNOSIS — R35.0 FREQUENCY OF MICTURITION: ICD-10-CM

## 2021-10-06 LAB
ALBUMIN UR-MCNC: 10 MG/DL
APPEARANCE UR: ABNORMAL
BACTERIA #/AREA URNS HPF: ABNORMAL /HPF
BILIRUB UR QL STRIP: NEGATIVE
CAOX CRY #/AREA URNS HPF: ABNORMAL /HPF
COLOR UR AUTO: YELLOW
GLUCOSE UR STRIP-MCNC: NEGATIVE MG/DL
HGB UR QL STRIP: NEGATIVE
KETONES UR STRIP-MCNC: NEGATIVE MG/DL
LEUKOCYTE ESTERASE UR QL STRIP: ABNORMAL
MUCOUS THREADS #/AREA URNS LPF: PRESENT /LPF
NITRATE UR QL: POSITIVE
PH UR STRIP: 6 [PH] (ref 5–7)
RBC URINE: 1 /HPF
SP GR UR STRIP: 1.02 (ref 1–1.03)
SQUAMOUS EPITHELIAL: 1 /HPF
TRANSITIONAL EPI: <1 /HPF
UROBILINOGEN UR STRIP-MCNC: <2 MG/DL
WBC URINE: 104 /HPF

## 2021-10-06 PROCEDURE — 87086 URINE CULTURE/COLONY COUNT: CPT | Mod: ORL | Performed by: REGISTERED NURSE

## 2021-10-06 PROCEDURE — 81001 URINALYSIS AUTO W/SCOPE: CPT | Mod: ORL | Performed by: REGISTERED NURSE

## 2021-10-09 LAB — BACTERIA UR CULT: ABNORMAL

## 2021-11-15 ENCOUNTER — LAB REQUISITION (OUTPATIENT)
Dept: LAB | Facility: CLINIC | Age: 86
End: 2021-11-15
Payer: COMMERCIAL

## 2021-11-15 DIAGNOSIS — I10 ESSENTIAL (PRIMARY) HYPERTENSION: ICD-10-CM

## 2021-11-15 DIAGNOSIS — D63.1 ANEMIA IN CHRONIC KIDNEY DISEASE (CODE): ICD-10-CM

## 2021-11-15 DIAGNOSIS — N39.0 URINARY TRACT INFECTION, SITE NOT SPECIFIED: ICD-10-CM

## 2021-11-15 DIAGNOSIS — N18.31 CHRONIC KIDNEY DISEASE, STAGE 3A (H): ICD-10-CM

## 2021-11-15 DIAGNOSIS — E87.5 HYPERKALEMIA: ICD-10-CM

## 2021-11-15 DIAGNOSIS — E83.42 HYPOMAGNESEMIA: ICD-10-CM

## 2021-11-17 LAB
ALBUMIN SERPL-MCNC: 3.1 G/DL (ref 3.5–5)
ANION GAP SERPL CALCULATED.3IONS-SCNC: 11 MMOL/L (ref 5–18)
BUN SERPL-MCNC: 19 MG/DL (ref 8–28)
CALCIUM SERPL-MCNC: 9.8 MG/DL (ref 8.5–10.5)
CHLORIDE BLD-SCNC: 105 MMOL/L (ref 98–107)
CO2 SERPL-SCNC: 22 MMOL/L (ref 22–31)
CREAT SERPL-MCNC: 1.07 MG/DL (ref 0.6–1.1)
ERYTHROCYTE [DISTWIDTH] IN BLOOD BY AUTOMATED COUNT: 12.9 % (ref 10–15)
GFR SERPL CREATININE-BSD FRML MDRD: 45 ML/MIN/1.73M2
GLUCOSE BLD-MCNC: 134 MG/DL (ref 70–125)
HBA1C MFR BLD: 5.8 %
HCT VFR BLD AUTO: 40.2 % (ref 35–47)
HGB BLD-MCNC: 12.4 G/DL (ref 11.7–15.7)
MCH RBC QN AUTO: 30.2 PG (ref 26.5–33)
MCHC RBC AUTO-ENTMCNC: 30.8 G/DL (ref 31.5–36.5)
MCV RBC AUTO: 98 FL (ref 78–100)
PHOSPHATE SERPL-MCNC: 3.1 MG/DL (ref 2.5–4.5)
PLATELET # BLD AUTO: 274 10E3/UL (ref 150–450)
POTASSIUM BLD-SCNC: 4.5 MMOL/L (ref 3.5–5)
RBC # BLD AUTO: 4.11 10E6/UL (ref 3.8–5.2)
SODIUM SERPL-SCNC: 138 MMOL/L (ref 136–145)
WBC # BLD AUTO: 4.8 10E3/UL (ref 4–11)

## 2021-11-17 PROCEDURE — 80069 RENAL FUNCTION PANEL: CPT | Mod: ORL | Performed by: INTERNAL MEDICINE

## 2021-11-17 PROCEDURE — 83036 HEMOGLOBIN GLYCOSYLATED A1C: CPT | Mod: ORL | Performed by: INTERNAL MEDICINE

## 2021-11-17 PROCEDURE — P9604 ONE-WAY ALLOW PRORATED TRIP: HCPCS | Mod: ORL | Performed by: INTERNAL MEDICINE

## 2021-11-17 PROCEDURE — 36415 COLL VENOUS BLD VENIPUNCTURE: CPT | Mod: ORL | Performed by: INTERNAL MEDICINE

## 2021-11-17 PROCEDURE — 85027 COMPLETE CBC AUTOMATED: CPT | Mod: ORL | Performed by: INTERNAL MEDICINE

## 2021-12-02 ENCOUNTER — LAB REQUISITION (OUTPATIENT)
Dept: LAB | Facility: CLINIC | Age: 86
End: 2021-12-02
Payer: COMMERCIAL

## 2021-12-02 LAB
ALBUMIN UR-MCNC: NEGATIVE MG/DL
APPEARANCE UR: CLEAR
BACTERIA #/AREA URNS HPF: ABNORMAL /HPF
BILIRUB UR QL STRIP: NEGATIVE
COLOR UR AUTO: COLORLESS
GLUCOSE UR STRIP-MCNC: NEGATIVE MG/DL
HGB UR QL STRIP: NEGATIVE
KETONES UR STRIP-MCNC: NEGATIVE MG/DL
LEUKOCYTE ESTERASE UR QL STRIP: ABNORMAL
NITRATE UR QL: NEGATIVE
PH UR STRIP: 7.5 [PH] (ref 5–7)
RBC URINE: 1 /HPF
SP GR UR STRIP: 1.01 (ref 1–1.03)
UROBILINOGEN UR STRIP-MCNC: <2 MG/DL
WBC URINE: 20 /HPF

## 2021-12-02 PROCEDURE — 87186 SC STD MICRODIL/AGAR DIL: CPT | Mod: ORL

## 2021-12-02 PROCEDURE — 81003 URINALYSIS AUTO W/O SCOPE: CPT | Mod: ORL | Performed by: REGISTERED NURSE

## 2021-12-02 PROCEDURE — 81001 URINALYSIS AUTO W/SCOPE: CPT | Mod: ORL

## 2021-12-02 PROCEDURE — 87186 SC STD MICRODIL/AGAR DIL: CPT | Mod: ORL | Performed by: REGISTERED NURSE

## 2021-12-03 ENCOUNTER — LAB REQUISITION (OUTPATIENT)
Dept: LAB | Facility: CLINIC | Age: 86
End: 2021-12-03
Payer: COMMERCIAL

## 2021-12-03 DIAGNOSIS — D63.1 ANEMIA IN CHRONIC KIDNEY DISEASE (CODE): ICD-10-CM

## 2021-12-03 DIAGNOSIS — N39.0 URINARY TRACT INFECTION, SITE NOT SPECIFIED: ICD-10-CM

## 2021-12-04 LAB — BACTERIA UR CULT: ABNORMAL

## 2021-12-06 LAB
ALBUMIN SERPL-MCNC: 3.4 G/DL (ref 3.5–5)
ANION GAP SERPL CALCULATED.3IONS-SCNC: 9 MMOL/L (ref 5–18)
BASOPHILS # BLD AUTO: 0 10E3/UL (ref 0–0.2)
BASOPHILS NFR BLD AUTO: 0 %
BUN SERPL-MCNC: 26 MG/DL (ref 8–28)
CALCIUM SERPL-MCNC: 10 MG/DL (ref 8.5–10.5)
CHLORIDE BLD-SCNC: 105 MMOL/L (ref 98–107)
CO2 SERPL-SCNC: 24 MMOL/L (ref 22–31)
CREAT SERPL-MCNC: 1.21 MG/DL (ref 0.6–1.1)
EOSINOPHIL # BLD AUTO: 0.1 10E3/UL (ref 0–0.7)
EOSINOPHIL NFR BLD AUTO: 1 %
ERYTHROCYTE [DISTWIDTH] IN BLOOD BY AUTOMATED COUNT: 13.3 % (ref 10–15)
GFR SERPL CREATININE-BSD FRML MDRD: 39 ML/MIN/1.73M2
GLUCOSE BLD-MCNC: 80 MG/DL (ref 70–125)
HBA1C MFR BLD: 5.8 %
HCT VFR BLD AUTO: 40.2 % (ref 35–47)
HGB BLD-MCNC: 12.4 G/DL (ref 11.7–15.7)
IMM GRANULOCYTES # BLD: 0 10E3/UL
IMM GRANULOCYTES NFR BLD: 1 %
LYMPHOCYTES # BLD AUTO: 1.3 10E3/UL (ref 0.8–5.3)
LYMPHOCYTES NFR BLD AUTO: 17 %
MCH RBC QN AUTO: 30 PG (ref 26.5–33)
MCHC RBC AUTO-ENTMCNC: 30.8 G/DL (ref 31.5–36.5)
MCV RBC AUTO: 97 FL (ref 78–100)
MONOCYTES # BLD AUTO: 0.8 10E3/UL (ref 0–1.3)
MONOCYTES NFR BLD AUTO: 10 %
NEUTROPHILS # BLD AUTO: 5.6 10E3/UL (ref 1.6–8.3)
NEUTROPHILS NFR BLD AUTO: 71 %
NRBC # BLD AUTO: 0 10E3/UL
NRBC BLD AUTO-RTO: 0 /100
PHOSPHATE SERPL-MCNC: 3.3 MG/DL (ref 2.5–4.5)
PLATELET # BLD AUTO: 320 10E3/UL (ref 150–450)
POTASSIUM BLD-SCNC: 5.2 MMOL/L (ref 3.5–5)
RBC # BLD AUTO: 4.13 10E6/UL (ref 3.8–5.2)
SODIUM SERPL-SCNC: 138 MMOL/L (ref 136–145)
WBC # BLD AUTO: 7.8 10E3/UL (ref 4–11)

## 2021-12-06 PROCEDURE — 36415 COLL VENOUS BLD VENIPUNCTURE: CPT | Mod: ORL | Performed by: INTERNAL MEDICINE

## 2021-12-06 PROCEDURE — 83036 HEMOGLOBIN GLYCOSYLATED A1C: CPT | Mod: ORL | Performed by: INTERNAL MEDICINE

## 2021-12-06 PROCEDURE — 85025 COMPLETE CBC W/AUTO DIFF WBC: CPT | Mod: ORL | Performed by: INTERNAL MEDICINE

## 2021-12-06 PROCEDURE — 80069 RENAL FUNCTION PANEL: CPT | Mod: ORL | Performed by: INTERNAL MEDICINE

## 2021-12-07 ENCOUNTER — LAB REQUISITION (OUTPATIENT)
Dept: LAB | Facility: CLINIC | Age: 86
End: 2021-12-07
Payer: COMMERCIAL

## 2021-12-07 DIAGNOSIS — I10 ESSENTIAL (PRIMARY) HYPERTENSION: ICD-10-CM

## 2021-12-07 DIAGNOSIS — N18.30 CHRONIC KIDNEY DISEASE, STAGE 3 UNSPECIFIED (H): ICD-10-CM

## 2021-12-07 DIAGNOSIS — E55.9 VITAMIN D DEFICIENCY, UNSPECIFIED: ICD-10-CM

## 2021-12-08 ENCOUNTER — LAB REQUISITION (OUTPATIENT)
Dept: LAB | Facility: CLINIC | Age: 86
End: 2021-12-08
Payer: COMMERCIAL

## 2021-12-08 DIAGNOSIS — M79.621 PAIN IN RIGHT UPPER ARM: ICD-10-CM

## 2021-12-08 LAB
ANION GAP SERPL CALCULATED.3IONS-SCNC: 11 MMOL/L (ref 5–18)
BASOPHILS # BLD AUTO: 0.1 10E3/UL (ref 0–0.2)
BASOPHILS NFR BLD AUTO: 0 %
BUN SERPL-MCNC: 31 MG/DL (ref 8–28)
CALCIUM SERPL-MCNC: 9.4 MG/DL (ref 8.5–10.5)
CHLORIDE BLD-SCNC: 102 MMOL/L (ref 98–107)
CO2 SERPL-SCNC: 21 MMOL/L (ref 22–31)
CREAT SERPL-MCNC: 1.31 MG/DL (ref 0.6–1.1)
EOSINOPHIL # BLD AUTO: 0 10E3/UL (ref 0–0.7)
EOSINOPHIL NFR BLD AUTO: 0 %
ERYTHROCYTE [DISTWIDTH] IN BLOOD BY AUTOMATED COUNT: 13.5 % (ref 10–15)
GFR SERPL CREATININE-BSD FRML MDRD: 35 ML/MIN/1.73M2
GLUCOSE BLD-MCNC: 121 MG/DL (ref 70–125)
HCT VFR BLD AUTO: 37.4 % (ref 35–47)
HGB BLD-MCNC: 11.7 G/DL (ref 11.7–15.7)
IMM GRANULOCYTES # BLD: 0.1 10E3/UL
IMM GRANULOCYTES NFR BLD: 1 %
LYMPHOCYTES # BLD AUTO: 0.7 10E3/UL (ref 0.8–5.3)
LYMPHOCYTES NFR BLD AUTO: 3 %
MCH RBC QN AUTO: 29.9 PG (ref 26.5–33)
MCHC RBC AUTO-ENTMCNC: 31.3 G/DL (ref 31.5–36.5)
MCV RBC AUTO: 96 FL (ref 78–100)
MONOCYTES # BLD AUTO: 1 10E3/UL (ref 0–1.3)
MONOCYTES NFR BLD AUTO: 5 %
NEUTROPHILS # BLD AUTO: 18.7 10E3/UL (ref 1.6–8.3)
NEUTROPHILS NFR BLD AUTO: 91 %
NRBC # BLD AUTO: 0 10E3/UL
NRBC BLD AUTO-RTO: 0 /100
PLATELET # BLD AUTO: 288 10E3/UL (ref 150–450)
POTASSIUM BLD-SCNC: 4.8 MMOL/L (ref 3.5–5)
RBC # BLD AUTO: 3.91 10E6/UL (ref 3.8–5.2)
SODIUM SERPL-SCNC: 134 MMOL/L (ref 136–145)
WBC # BLD AUTO: 20.5 10E3/UL (ref 4–11)

## 2021-12-08 PROCEDURE — 80048 BASIC METABOLIC PNL TOTAL CA: CPT | Mod: ORL | Performed by: FAMILY MEDICINE

## 2021-12-08 PROCEDURE — 82306 VITAMIN D 25 HYDROXY: CPT | Mod: ORL | Performed by: FAMILY MEDICINE

## 2021-12-08 PROCEDURE — 85025 COMPLETE CBC W/AUTO DIFF WBC: CPT | Mod: ORL | Performed by: FAMILY MEDICINE

## 2021-12-08 PROCEDURE — 36415 COLL VENOUS BLD VENIPUNCTURE: CPT | Mod: ORL | Performed by: FAMILY MEDICINE

## 2021-12-08 PROCEDURE — P9604 ONE-WAY ALLOW PRORATED TRIP: HCPCS | Mod: ORL | Performed by: FAMILY MEDICINE

## 2021-12-09 LAB
DEPRECATED CALCIDIOL+CALCIFEROL SERPL-MC: 23 UG/L (ref 30–80)
ERYTHROCYTE [SEDIMENTATION RATE] IN BLOOD BY WESTERGREN METHOD: 50 MM/HR (ref 0–20)

## 2021-12-09 PROCEDURE — 85652 RBC SED RATE AUTOMATED: CPT | Mod: ORL | Performed by: FAMILY MEDICINE

## 2021-12-09 PROCEDURE — P9604 ONE-WAY ALLOW PRORATED TRIP: HCPCS | Mod: ORL | Performed by: FAMILY MEDICINE

## 2021-12-09 PROCEDURE — 36415 COLL VENOUS BLD VENIPUNCTURE: CPT | Mod: ORL | Performed by: FAMILY MEDICINE

## 2022-01-01 ENCOUNTER — APPOINTMENT (OUTPATIENT)
Dept: CARDIOLOGY | Facility: CLINIC | Age: 87
End: 2022-01-01
Payer: COMMERCIAL

## 2022-01-01 ENCOUNTER — HOSPITAL ENCOUNTER (OUTPATIENT)
Facility: CLINIC | Age: 87
Setting detail: OBSERVATION
Discharge: HOME | End: 2022-12-16
Attending: EMERGENCY MEDICINE | Admitting: INTERNAL MEDICINE
Payer: COMMERCIAL

## 2022-01-01 ENCOUNTER — LAB REQUISITION (OUTPATIENT)
Dept: LAB | Facility: CLINIC | Age: 87
End: 2022-01-01
Payer: COMMERCIAL

## 2022-01-01 ENCOUNTER — APPOINTMENT (OUTPATIENT)
Dept: CT IMAGING | Facility: CLINIC | Age: 87
End: 2022-01-01
Attending: EMERGENCY MEDICINE
Payer: COMMERCIAL

## 2022-01-01 VITALS
HEART RATE: 64 BPM | OXYGEN SATURATION: 97 % | TEMPERATURE: 97.6 F | SYSTOLIC BLOOD PRESSURE: 146 MMHG | DIASTOLIC BLOOD PRESSURE: 81 MMHG | HEIGHT: 61 IN | BODY MASS INDEX: 24.02 KG/M2 | RESPIRATION RATE: 18 BRPM | WEIGHT: 127.2 LBS

## 2022-01-01 DIAGNOSIS — E78.5 HYPERLIPIDEMIA, UNSPECIFIED HYPERLIPIDEMIA TYPE: ICD-10-CM

## 2022-01-01 DIAGNOSIS — E83.42 HYPOMAGNESEMIA: ICD-10-CM

## 2022-01-01 DIAGNOSIS — I10 ESSENTIAL (PRIMARY) HYPERTENSION: ICD-10-CM

## 2022-01-01 DIAGNOSIS — E03.8 OTHER SPECIFIED HYPOTHYROIDISM: ICD-10-CM

## 2022-01-01 DIAGNOSIS — I10 PRIMARY HYPERTENSION: ICD-10-CM

## 2022-01-01 DIAGNOSIS — N39.0 URINARY TRACT INFECTION WITHOUT HEMATURIA, SITE UNSPECIFIED: ICD-10-CM

## 2022-01-01 DIAGNOSIS — H04.123 DRY EYES: Primary | ICD-10-CM

## 2022-01-01 DIAGNOSIS — D63.1 ANEMIA IN CHRONIC KIDNEY DISEASE (CODE): ICD-10-CM

## 2022-01-01 DIAGNOSIS — N39.0 URINARY TRACT INFECTION, SITE NOT SPECIFIED: ICD-10-CM

## 2022-01-01 DIAGNOSIS — R07.89 ATYPICAL CHEST PAIN: ICD-10-CM

## 2022-01-01 DIAGNOSIS — U07.1 INFECTION DUE TO 2019 NOVEL CORONAVIRUS: ICD-10-CM

## 2022-01-01 DIAGNOSIS — N18.31 CHRONIC KIDNEY DISEASE, STAGE 3A (H): ICD-10-CM

## 2022-01-01 DIAGNOSIS — K21.9 GASTROESOPHAGEAL REFLUX DISEASE WITHOUT ESOPHAGITIS: ICD-10-CM

## 2022-01-01 DIAGNOSIS — K21.9 GASTRO-ESOPHAGEAL REFLUX DISEASE WITHOUT ESOPHAGITIS: ICD-10-CM

## 2022-01-01 DIAGNOSIS — R07.9 CHEST PAIN, UNSPECIFIED TYPE: ICD-10-CM

## 2022-01-01 LAB
ALBUMIN SERPL BCG-MCNC: 3.4 G/DL (ref 3.5–5.2)
ALBUMIN SERPL BCG-MCNC: 3.4 G/DL (ref 3.5–5.2)
ALBUMIN SERPL BCG-MCNC: 3.5 G/DL (ref 3.5–5.2)
ALBUMIN SERPL BCG-MCNC: 3.6 G/DL (ref 3.5–5.2)
ALBUMIN UR-MCNC: NEGATIVE MG/DL
ALBUMIN UR-MCNC: NEGATIVE MG/DL
ALP SERPL-CCNC: 55 U/L (ref 35–104)
ALP SERPL-CCNC: 59 U/L (ref 35–104)
ALP SERPL-CCNC: 62 U/L (ref 35–104)
ALT SERPL W P-5'-P-CCNC: 11 U/L (ref 10–35)
ALT SERPL W P-5'-P-CCNC: 12 U/L (ref 10–35)
ALT SERPL W P-5'-P-CCNC: 8 U/L (ref 10–35)
ANION GAP SERPL CALCULATED.3IONS-SCNC: 10 MMOL/L (ref 7–15)
ANION GAP SERPL CALCULATED.3IONS-SCNC: 11 MMOL/L (ref 7–15)
ANION GAP SERPL CALCULATED.3IONS-SCNC: 11 MMOL/L (ref 7–15)
ANION GAP SERPL CALCULATED.3IONS-SCNC: 12 MMOL/L (ref 7–15)
ANION GAP SERPL CALCULATED.3IONS-SCNC: 15 MMOL/L (ref 7–15)
ANION GAP SERPL CALCULATED.3IONS-SCNC: 16 MMOL/L (ref 7–15)
APPEARANCE UR: CLEAR
APPEARANCE UR: CLEAR
AST SERPL W P-5'-P-CCNC: 20 U/L (ref 10–35)
AST SERPL W P-5'-P-CCNC: 22 U/L (ref 10–35)
AST SERPL W P-5'-P-CCNC: ABNORMAL U/L
ATRIAL RATE - MUSE: 54 BPM
ATRIAL RATE - MUSE: 55 BPM
ATRIAL RATE - MUSE: 65 BPM
BACTERIA #/AREA URNS HPF: ABNORMAL /HPF
BACTERIA #/AREA URNS HPF: ABNORMAL /HPF
BACTERIA UR CULT: ABNORMAL
BACTERIA UR CULT: ABNORMAL
BASOPHILS # BLD AUTO: 0 10E3/UL (ref 0–0.2)
BASOPHILS # BLD AUTO: 0 10E3/UL (ref 0–0.2)
BASOPHILS NFR BLD AUTO: 0 %
BASOPHILS NFR BLD AUTO: 1 %
BILIRUB SERPL-MCNC: 0.2 MG/DL
BILIRUB SERPL-MCNC: 0.2 MG/DL
BILIRUB SERPL-MCNC: 0.3 MG/DL
BILIRUB UR QL STRIP: NEGATIVE
BILIRUB UR QL STRIP: NEGATIVE
BUN SERPL-MCNC: 24 MG/DL (ref 8–23)
BUN SERPL-MCNC: 27 MG/DL (ref 8–23)
BUN SERPL-MCNC: 28.1 MG/DL (ref 8–23)
BUN SERPL-MCNC: 29.3 MG/DL (ref 8–23)
BUN SERPL-MCNC: 30.5 MG/DL (ref 8–23)
BUN SERPL-MCNC: 33 MG/DL (ref 8–23)
CALCIUM SERPL-MCNC: 9 MG/DL (ref 8.2–9.6)
CALCIUM SERPL-MCNC: 9.1 MG/DL (ref 8.2–9.6)
CALCIUM SERPL-MCNC: 9.4 MG/DL (ref 8.2–9.6)
CALCIUM SERPL-MCNC: 9.5 MG/DL (ref 8.2–9.6)
CALCIUM SERPL-MCNC: 9.6 MG/DL (ref 8.2–9.6)
CALCIUM SERPL-MCNC: 9.8 MG/DL (ref 8.2–9.6)
CHLORIDE SERPL-SCNC: 103 MMOL/L (ref 98–107)
CHLORIDE SERPL-SCNC: 104 MMOL/L (ref 98–107)
CHLORIDE SERPL-SCNC: 106 MMOL/L (ref 98–107)
CHLORIDE SERPL-SCNC: 108 MMOL/L (ref 98–107)
COLOR UR AUTO: ABNORMAL
COLOR UR AUTO: ABNORMAL
CREAT BLD-MCNC: 1.4 MG/DL (ref 0.5–1)
CREAT SERPL-MCNC: 0.98 MG/DL (ref 0.51–0.95)
CREAT SERPL-MCNC: 1 MG/DL (ref 0.51–0.95)
CREAT SERPL-MCNC: 1.01 MG/DL (ref 0.51–0.95)
CREAT SERPL-MCNC: 1.01 MG/DL (ref 0.51–0.95)
CREAT SERPL-MCNC: 1.22 MG/DL (ref 0.51–0.95)
CREAT SERPL-MCNC: 1.23 MG/DL (ref 0.51–0.95)
CRP SERPL-MCNC: 4.06 MG/L
CRP SERPL-MCNC: <3 MG/L
CRP SERPL-MCNC: <3 MG/L
D DIMER PPP FEU-MCNC: 1.78 UG/ML FEU (ref 0–0.5)
D DIMER PPP FEU-MCNC: 2.03 UG/ML FEU (ref 0–0.5)
D DIMER PPP FEU-MCNC: 2.04 UG/ML FEU (ref 0–0.5)
DEPRECATED HCO3 PLAS-SCNC: 18 MMOL/L (ref 22–29)
DEPRECATED HCO3 PLAS-SCNC: 19 MMOL/L (ref 22–29)
DEPRECATED HCO3 PLAS-SCNC: 21 MMOL/L (ref 22–29)
DEPRECATED HCO3 PLAS-SCNC: 22 MMOL/L (ref 22–29)
DIASTOLIC BLOOD PRESSURE - MUSE: NORMAL MMHG
EOSINOPHIL # BLD AUTO: 0.1 10E3/UL (ref 0–0.7)
EOSINOPHIL # BLD AUTO: 0.1 10E3/UL (ref 0–0.7)
EOSINOPHIL NFR BLD AUTO: 1 %
EOSINOPHIL NFR BLD AUTO: 1 %
ERYTHROCYTE [DISTWIDTH] IN BLOOD BY AUTOMATED COUNT: 12.8 % (ref 10–15)
ERYTHROCYTE [DISTWIDTH] IN BLOOD BY AUTOMATED COUNT: 12.9 % (ref 10–15)
ERYTHROCYTE [DISTWIDTH] IN BLOOD BY AUTOMATED COUNT: 13.2 % (ref 10–15)
ERYTHROCYTE [DISTWIDTH] IN BLOOD BY AUTOMATED COUNT: 13.2 % (ref 10–15)
ERYTHROCYTE [DISTWIDTH] IN BLOOD BY AUTOMATED COUNT: 13.5 % (ref 10–15)
GFR SERPL CREATININE-BSD FRML MDRD: 35 ML/MIN/1.73M2
GFR SERPL CREATININE-BSD FRML MDRD: 41 ML/MIN/1.73M2
GFR SERPL CREATININE-BSD FRML MDRD: 41 ML/MIN/1.73M2
GFR SERPL CREATININE-BSD FRML MDRD: 52 ML/MIN/1.73M2
GFR SERPL CREATININE-BSD FRML MDRD: 54 ML/MIN/1.73M2
GLUCOSE BLDC GLUCOMTR-MCNC: 102 MG/DL (ref 70–99)
GLUCOSE BLDC GLUCOMTR-MCNC: 103 MG/DL (ref 70–99)
GLUCOSE BLDC GLUCOMTR-MCNC: 109 MG/DL (ref 70–99)
GLUCOSE BLDC GLUCOMTR-MCNC: 109 MG/DL (ref 70–99)
GLUCOSE SERPL-MCNC: 104 MG/DL (ref 70–99)
GLUCOSE SERPL-MCNC: 121 MG/DL (ref 70–99)
GLUCOSE SERPL-MCNC: 122 MG/DL (ref 70–99)
GLUCOSE SERPL-MCNC: 90 MG/DL (ref 70–99)
GLUCOSE SERPL-MCNC: 95 MG/DL (ref 70–99)
GLUCOSE SERPL-MCNC: 97 MG/DL (ref 70–99)
GLUCOSE UR STRIP-MCNC: NEGATIVE MG/DL
GLUCOSE UR STRIP-MCNC: NEGATIVE MG/DL
HCT VFR BLD AUTO: 39.2 % (ref 35–47)
HCT VFR BLD AUTO: 40.5 % (ref 35–47)
HCT VFR BLD AUTO: 41.7 % (ref 35–47)
HCT VFR BLD AUTO: 41.9 % (ref 35–47)
HCT VFR BLD AUTO: 42.9 % (ref 35–47)
HGB BLD-MCNC: 12.6 G/DL (ref 11.7–15.7)
HGB BLD-MCNC: 13 G/DL (ref 11.7–15.7)
HGB BLD-MCNC: 13 G/DL (ref 11.7–15.7)
HGB BLD-MCNC: 13.3 G/DL (ref 11.7–15.7)
HGB BLD-MCNC: 13.4 G/DL (ref 11.7–15.7)
HGB UR QL STRIP: NEGATIVE
HGB UR QL STRIP: NEGATIVE
HOLD SPECIMEN: NORMAL
HOLD SPECIMEN: NORMAL
IMM GRANULOCYTES # BLD: 0 10E3/UL
IMM GRANULOCYTES # BLD: 0 10E3/UL
IMM GRANULOCYTES NFR BLD: 0 %
IMM GRANULOCYTES NFR BLD: 1 %
INR PPP: 1.01 (ref 0.85–1.15)
INR PPP: 1.06 (ref 0.85–1.15)
INTERPRETATION ECG - MUSE: NORMAL
KETONES UR STRIP-MCNC: NEGATIVE MG/DL
KETONES UR STRIP-MCNC: NEGATIVE MG/DL
LEUKOCYTE ESTERASE UR QL STRIP: ABNORMAL
LEUKOCYTE ESTERASE UR QL STRIP: ABNORMAL
LVEF ECHO: NORMAL
LYMPHOCYTES # BLD AUTO: 1.2 10E3/UL (ref 0.8–5.3)
LYMPHOCYTES # BLD AUTO: 1.4 10E3/UL (ref 0.8–5.3)
LYMPHOCYTES NFR BLD AUTO: 17 %
LYMPHOCYTES NFR BLD AUTO: 19 %
MAGNESIUM SERPL-MCNC: 2.1 MG/DL (ref 1.7–2.3)
MCH RBC QN AUTO: 31 PG (ref 26.5–33)
MCH RBC QN AUTO: 31.1 PG (ref 26.5–33)
MCH RBC QN AUTO: 31.4 PG (ref 26.5–33)
MCH RBC QN AUTO: 31.4 PG (ref 26.5–33)
MCH RBC QN AUTO: 31.6 PG (ref 26.5–33)
MCHC RBC AUTO-ENTMCNC: 31 G/DL (ref 31.5–36.5)
MCHC RBC AUTO-ENTMCNC: 31.2 G/DL (ref 31.5–36.5)
MCHC RBC AUTO-ENTMCNC: 31.9 G/DL (ref 31.5–36.5)
MCHC RBC AUTO-ENTMCNC: 32.1 G/DL (ref 31.5–36.5)
MCHC RBC AUTO-ENTMCNC: 32.1 G/DL (ref 31.5–36.5)
MCV RBC AUTO: 101 FL (ref 78–100)
MCV RBC AUTO: 98 FL (ref 78–100)
MCV RBC AUTO: 99 FL (ref 78–100)
MONOCYTES # BLD AUTO: 0.7 10E3/UL (ref 0–1.3)
MONOCYTES # BLD AUTO: 0.8 10E3/UL (ref 0–1.3)
MONOCYTES NFR BLD AUTO: 11 %
MONOCYTES NFR BLD AUTO: 9 %
MUCOUS THREADS #/AREA URNS LPF: PRESENT /LPF
MUCOUS THREADS #/AREA URNS LPF: PRESENT /LPF
NEUTROPHILS # BLD AUTO: 5.1 10E3/UL (ref 1.6–8.3)
NEUTROPHILS # BLD AUTO: 5.1 10E3/UL (ref 1.6–8.3)
NEUTROPHILS NFR BLD AUTO: 69 %
NEUTROPHILS NFR BLD AUTO: 71 %
NITRATE UR QL: NEGATIVE
NITRATE UR QL: POSITIVE
NRBC # BLD AUTO: 0 10E3/UL
NRBC # BLD AUTO: 0 10E3/UL
NRBC BLD AUTO-RTO: 0 /100
NRBC BLD AUTO-RTO: 0 /100
P AXIS - MUSE: 52 DEGREES
P AXIS - MUSE: 65 DEGREES
P AXIS - MUSE: 84 DEGREES
PH UR STRIP: 6.5 [PH] (ref 5–7)
PH UR STRIP: 6.5 [PH] (ref 5–7)
PHOSPHATE SERPL-MCNC: 3.2 MG/DL (ref 2.5–4.5)
PLATELET # BLD AUTO: 273 10E3/UL (ref 150–450)
PLATELET # BLD AUTO: 322 10E3/UL (ref 150–450)
PLATELET # BLD AUTO: 323 10E3/UL (ref 150–450)
PLATELET # BLD AUTO: 330 10E3/UL (ref 150–450)
PLATELET # BLD AUTO: 363 10E3/UL (ref 150–450)
POTASSIUM SERPL-SCNC: 4.3 MMOL/L (ref 3.4–5.3)
POTASSIUM SERPL-SCNC: 4.4 MMOL/L (ref 3.4–5.3)
POTASSIUM SERPL-SCNC: 4.5 MMOL/L (ref 3.4–5.3)
POTASSIUM SERPL-SCNC: 4.6 MMOL/L (ref 3.4–5.3)
POTASSIUM SERPL-SCNC: 4.8 MMOL/L (ref 3.4–5.3)
POTASSIUM SERPL-SCNC: 4.9 MMOL/L (ref 3.4–5.3)
PR INTERVAL - MUSE: 202 MS
PR INTERVAL - MUSE: 220 MS
PR INTERVAL - MUSE: 220 MS
PROT SERPL-MCNC: 6.7 G/DL (ref 6.4–8.3)
PROT SERPL-MCNC: 7.1 G/DL (ref 6.4–8.3)
PROT SERPL-MCNC: 7.1 G/DL (ref 6.4–8.3)
QRS DURATION - MUSE: 132 MS
QRS DURATION - MUSE: 136 MS
QRS DURATION - MUSE: 138 MS
QT - MUSE: 438 MS
QT - MUSE: 442 MS
QT - MUSE: 472 MS
QTC - MUSE: 419 MS
QTC - MUSE: 447 MS
QTC - MUSE: 459 MS
R AXIS - MUSE: 101 DEGREES
R AXIS - MUSE: 99 DEGREES
R AXIS - MUSE: 99 DEGREES
RBC # BLD AUTO: 4.01 10E6/UL (ref 3.8–5.2)
RBC # BLD AUTO: 4.12 10E6/UL (ref 3.8–5.2)
RBC # BLD AUTO: 4.14 10E6/UL (ref 3.8–5.2)
RBC # BLD AUTO: 4.27 10E6/UL (ref 3.8–5.2)
RBC # BLD AUTO: 4.32 10E6/UL (ref 3.8–5.2)
RBC URINE: 0 /HPF
RBC URINE: 1 /HPF
SARS-COV-2 RNA RESP QL NAA+PROBE: POSITIVE
SODIUM SERPL-SCNC: 137 MMOL/L (ref 136–145)
SODIUM SERPL-SCNC: 138 MMOL/L (ref 136–145)
SODIUM SERPL-SCNC: 139 MMOL/L (ref 136–145)
SODIUM SERPL-SCNC: 140 MMOL/L (ref 136–145)
SP GR UR STRIP: 1.01 (ref 1–1.03)
SP GR UR STRIP: 1.01 (ref 1–1.03)
SQUAMOUS EPITHELIAL: <1 /HPF
SQUAMOUS EPITHELIAL: <1 /HPF
SYSTOLIC BLOOD PRESSURE - MUSE: NORMAL MMHG
T AXIS - MUSE: 50 DEGREES
T AXIS - MUSE: 58 DEGREES
T AXIS - MUSE: 75 DEGREES
T4 FREE SERPL-MCNC: 1.36 NG/DL (ref 0.9–1.7)
TRANSITIONAL EPI: <1 /HPF
TROPONIN T SERPL HS-MCNC: 38 NG/L
TROPONIN T SERPL HS-MCNC: 39 NG/L
TROPONIN T SERPL HS-MCNC: 43 NG/L
TROPONIN T SERPL HS-MCNC: 43 NG/L
TSH SERPL DL<=0.005 MIU/L-ACNC: 5.66 UIU/ML (ref 0.3–4.2)
UROBILINOGEN UR STRIP-MCNC: NORMAL MG/DL
UROBILINOGEN UR STRIP-MCNC: NORMAL MG/DL
VENTRICULAR RATE- MUSE: 54 BPM
VENTRICULAR RATE- MUSE: 55 BPM
VENTRICULAR RATE- MUSE: 65 BPM
WBC # BLD AUTO: 4.6 10E3/UL (ref 4–11)
WBC # BLD AUTO: 5.2 10E3/UL (ref 4–11)
WBC # BLD AUTO: 6.6 10E3/UL (ref 4–11)
WBC # BLD AUTO: 7.3 10E3/UL (ref 4–11)
WBC # BLD AUTO: 7.3 10E3/UL (ref 4–11)
WBC URINE: 4 /HPF
WBC URINE: 67 /HPF

## 2022-01-01 PROCEDURE — 87088 URINE BACTERIA CULTURE: CPT | Mod: ORL | Performed by: INTERNAL MEDICINE

## 2022-01-01 PROCEDURE — 85379 FIBRIN DEGRADATION QUANT: CPT

## 2022-01-01 PROCEDURE — 86140 C-REACTIVE PROTEIN: CPT

## 2022-01-01 PROCEDURE — 96372 THER/PROPH/DIAG INJ SC/IM: CPT | Performed by: INTERNAL MEDICINE

## 2022-01-01 PROCEDURE — 258N000003 HC RX IP 258 OP 636

## 2022-01-01 PROCEDURE — G0378 HOSPITAL OBSERVATION PER HR: HCPCS

## 2022-01-01 PROCEDURE — 93010 ELECTROCARDIOGRAM REPORT: CPT | Performed by: EMERGENCY MEDICINE

## 2022-01-01 PROCEDURE — 71275 CT ANGIOGRAPHY CHEST: CPT

## 2022-01-01 PROCEDURE — 84439 ASSAY OF FREE THYROXINE: CPT

## 2022-01-01 PROCEDURE — 250N000011 HC RX IP 250 OP 636

## 2022-01-01 PROCEDURE — 250N000013 HC RX MED GY IP 250 OP 250 PS 637: Performed by: EMERGENCY MEDICINE

## 2022-01-01 PROCEDURE — 87086 URINE CULTURE/COLONY COUNT: CPT | Performed by: EMERGENCY MEDICINE

## 2022-01-01 PROCEDURE — 96367 TX/PROPH/DG ADDL SEQ IV INF: CPT

## 2022-01-01 PROCEDURE — 96366 THER/PROPH/DIAG IV INF ADDON: CPT

## 2022-01-01 PROCEDURE — 85025 COMPLETE CBC W/AUTO DIFF WBC: CPT

## 2022-01-01 PROCEDURE — 84484 ASSAY OF TROPONIN QUANT: CPT

## 2022-01-01 PROCEDURE — 250N000013 HC RX MED GY IP 250 OP 250 PS 637

## 2022-01-01 PROCEDURE — 81001 URINALYSIS AUTO W/SCOPE: CPT | Mod: ORL | Performed by: INTERNAL MEDICINE

## 2022-01-01 PROCEDURE — 36415 COLL VENOUS BLD VENIPUNCTURE: CPT

## 2022-01-01 PROCEDURE — 96376 TX/PRO/DX INJ SAME DRUG ADON: CPT

## 2022-01-01 PROCEDURE — 250N000011 HC RX IP 250 OP 636: Performed by: STUDENT IN AN ORGANIZED HEALTH CARE EDUCATION/TRAINING PROGRAM

## 2022-01-01 PROCEDURE — 258N000003 HC RX IP 258 OP 636: Performed by: STUDENT IN AN ORGANIZED HEALTH CARE EDUCATION/TRAINING PROGRAM

## 2022-01-01 PROCEDURE — 85025 COMPLETE CBC W/AUTO DIFF WBC: CPT | Performed by: EMERGENCY MEDICINE

## 2022-01-01 PROCEDURE — P9604 ONE-WAY ALLOW PRORATED TRIP: HCPCS | Mod: ORL | Performed by: INTERNAL MEDICINE

## 2022-01-01 PROCEDURE — 93306 TTE W/DOPPLER COMPLETE: CPT | Mod: 26 | Performed by: STUDENT IN AN ORGANIZED HEALTH CARE EDUCATION/TRAINING PROGRAM

## 2022-01-01 PROCEDURE — 84443 ASSAY THYROID STIM HORMONE: CPT

## 2022-01-01 PROCEDURE — C9803 HOPD COVID-19 SPEC COLLECT: HCPCS | Performed by: EMERGENCY MEDICINE

## 2022-01-01 PROCEDURE — 99285 EMERGENCY DEPT VISIT HI MDM: CPT | Mod: 25 | Performed by: EMERGENCY MEDICINE

## 2022-01-01 PROCEDURE — 99207 PR CDG-CUT & PASTE-POTENTIAL IMPACT ON LEVEL: CPT | Performed by: INTERNAL MEDICINE

## 2022-01-01 PROCEDURE — 83735 ASSAY OF MAGNESIUM: CPT | Performed by: STUDENT IN AN ORGANIZED HEALTH CARE EDUCATION/TRAINING PROGRAM

## 2022-01-01 PROCEDURE — 250N000011 HC RX IP 250 OP 636: Performed by: INTERNAL MEDICINE

## 2022-01-01 PROCEDURE — 99226 PR SUBSEQUENT OBSERVATION CARE,LEVEL III: CPT | Mod: GC | Performed by: INTERNAL MEDICINE

## 2022-01-01 PROCEDURE — 93005 ELECTROCARDIOGRAM TRACING: CPT

## 2022-01-01 PROCEDURE — 82565 ASSAY OF CREATININE: CPT | Mod: 91

## 2022-01-01 PROCEDURE — 85610 PROTHROMBIN TIME: CPT

## 2022-01-01 PROCEDURE — 250N000011 HC RX IP 250 OP 636: Performed by: EMERGENCY MEDICINE

## 2022-01-01 PROCEDURE — G0378 HOSPITAL OBSERVATION PER HR: HCPCS | Mod: CS

## 2022-01-01 PROCEDURE — 84484 ASSAY OF TROPONIN QUANT: CPT | Performed by: EMERGENCY MEDICINE

## 2022-01-01 PROCEDURE — 96365 THER/PROPH/DIAG IV INF INIT: CPT | Performed by: EMERGENCY MEDICINE

## 2022-01-01 PROCEDURE — 80069 RENAL FUNCTION PANEL: CPT | Mod: ORL | Performed by: INTERNAL MEDICINE

## 2022-01-01 PROCEDURE — 99217 PR OBSERVATION CARE DISCHARGE: CPT | Mod: GC | Performed by: INTERNAL MEDICINE

## 2022-01-01 PROCEDURE — 82374 ASSAY BLOOD CARBON DIOXIDE: CPT

## 2022-01-01 PROCEDURE — 93010 ELECTROCARDIOGRAM REPORT: CPT | Mod: 76 | Performed by: EMERGENCY MEDICINE

## 2022-01-01 PROCEDURE — 99220 PR INITIAL OBSERVATION CARE,LEVEL III: CPT | Mod: GC | Performed by: STUDENT IN AN ORGANIZED HEALTH CARE EDUCATION/TRAINING PROGRAM

## 2022-01-01 PROCEDURE — 80053 COMPREHEN METABOLIC PANEL: CPT | Performed by: EMERGENCY MEDICINE

## 2022-01-01 PROCEDURE — 80053 COMPREHEN METABOLIC PANEL: CPT

## 2022-01-01 PROCEDURE — 80048 BASIC METABOLIC PNL TOTAL CA: CPT

## 2022-01-01 PROCEDURE — U0005 INFEC AGEN DETEC AMPLI PROBE: HCPCS | Performed by: EMERGENCY MEDICINE

## 2022-01-01 PROCEDURE — 93005 ELECTROCARDIOGRAM TRACING: CPT | Mod: 76 | Performed by: EMERGENCY MEDICINE

## 2022-01-01 PROCEDURE — 36415 COLL VENOUS BLD VENIPUNCTURE: CPT | Performed by: EMERGENCY MEDICINE

## 2022-01-01 PROCEDURE — 84155 ASSAY OF PROTEIN SERUM: CPT

## 2022-01-01 PROCEDURE — 85027 COMPLETE CBC AUTOMATED: CPT | Mod: ORL | Performed by: INTERNAL MEDICINE

## 2022-01-01 PROCEDURE — 93306 TTE W/DOPPLER COMPLETE: CPT

## 2022-01-01 PROCEDURE — 85027 COMPLETE CBC AUTOMATED: CPT

## 2022-01-01 PROCEDURE — 85610 PROTHROMBIN TIME: CPT | Performed by: EMERGENCY MEDICINE

## 2022-01-01 PROCEDURE — 81001 URINALYSIS AUTO W/SCOPE: CPT | Performed by: EMERGENCY MEDICINE

## 2022-01-01 PROCEDURE — 120N000002 HC R&B MED SURG/OB UMMC

## 2022-01-01 PROCEDURE — 71275 CT ANGIOGRAPHY CHEST: CPT | Mod: 26 | Performed by: RADIOLOGY

## 2022-01-01 PROCEDURE — 93010 ELECTROCARDIOGRAM REPORT: CPT | Performed by: INTERNAL MEDICINE

## 2022-01-01 PROCEDURE — 36415 COLL VENOUS BLD VENIPUNCTURE: CPT | Mod: ORL | Performed by: INTERNAL MEDICINE

## 2022-01-01 PROCEDURE — 93005 ELECTROCARDIOGRAM TRACING: CPT | Performed by: EMERGENCY MEDICINE

## 2022-01-01 RX ORDER — ASPIRIN 81 MG/1
81 TABLET ORAL DAILY
Status: DISCONTINUED | OUTPATIENT
Start: 2022-01-01 | End: 2022-01-01 | Stop reason: HOSPADM

## 2022-01-01 RX ORDER — ASPIRIN 81 MG/1
81 TABLET ORAL DAILY
Status: ON HOLD | COMMUNITY
End: 2022-01-01

## 2022-01-01 RX ORDER — NITROGLYCERIN 0.4 MG/1
0.4 TABLET SUBLINGUAL ONCE
Status: COMPLETED | OUTPATIENT
Start: 2022-01-01 | End: 2022-01-01

## 2022-01-01 RX ORDER — LEVOTHYROXINE SODIUM 100 UG/1
100 TABLET ORAL DAILY
Status: ON HOLD | COMMUNITY
End: 2022-01-01

## 2022-01-01 RX ORDER — ANTIOX #8/OM3/DHA/EPA/LUT/ZEAX 250-2.5 MG
1 CAPSULE ORAL 2 TIMES DAILY
Status: ON HOLD | COMMUNITY
End: 2022-01-01

## 2022-01-01 RX ORDER — SUCRALFATE 1 G/1
1 TABLET ORAL 3 TIMES DAILY
Status: DISCONTINUED | OUTPATIENT
Start: 2022-01-01 | End: 2022-01-01 | Stop reason: HOSPADM

## 2022-01-01 RX ORDER — PRAVASTATIN SODIUM 20 MG
20 TABLET ORAL DAILY
Qty: 90 TABLET | Refills: 3 | Status: SHIPPED | OUTPATIENT
Start: 2022-01-01 | End: 2023-01-01

## 2022-01-01 RX ORDER — ACETAMINOPHEN 325 MG/1
650 TABLET ORAL DAILY PRN
Status: DISCONTINUED | OUTPATIENT
Start: 2022-01-01 | End: 2022-01-01 | Stop reason: HOSPADM

## 2022-01-01 RX ORDER — ACETAMINOPHEN 325 MG/1
650 TABLET ORAL DAILY PRN
Qty: 60 TABLET | Refills: 3 | Status: SHIPPED | OUTPATIENT
Start: 2022-01-01

## 2022-01-01 RX ORDER — SILICONE ADHESIVE 1.5" X 3"
1 SHEET (EA) TOPICAL DAILY
Qty: 30 ML | Refills: 3 | Status: SHIPPED | OUTPATIENT
Start: 2022-01-01

## 2022-01-01 RX ORDER — AMLODIPINE BESYLATE 2.5 MG/1
2.5 TABLET ORAL DAILY
Status: ON HOLD | COMMUNITY
End: 2022-01-01

## 2022-01-01 RX ORDER — PRAVASTATIN SODIUM 20 MG
20 TABLET ORAL DAILY
Status: ON HOLD | COMMUNITY
End: 2022-01-01

## 2022-01-01 RX ORDER — NITROGLYCERIN 0.4 MG/1
0.4 TABLET SUBLINGUAL EVERY 5 MIN PRN
Status: DISCONTINUED | OUTPATIENT
Start: 2022-01-01 | End: 2022-01-01 | Stop reason: HOSPADM

## 2022-01-01 RX ORDER — GLYCERIN .002; .002; .01 MG/MG; MG/MG; MG/MG
1 SOLUTION/ DROPS OPHTHALMIC DAILY PRN
Status: ON HOLD | COMMUNITY
End: 2022-01-01

## 2022-01-01 RX ORDER — PRAVASTATIN SODIUM 20 MG
20 TABLET ORAL DAILY
Status: DISCONTINUED | OUTPATIENT
Start: 2022-01-01 | End: 2022-01-01 | Stop reason: HOSPADM

## 2022-01-01 RX ORDER — SILICONE ADHESIVE 1.5" X 3"
1 SHEET (EA) TOPICAL DAILY
Status: DISCONTINUED | OUTPATIENT
Start: 2022-01-01 | End: 2022-01-01 | Stop reason: HOSPADM

## 2022-01-01 RX ORDER — GLYCERIN .002; .002; .01 MG/MG; MG/MG; MG/MG
1 SOLUTION/ DROPS OPHTHALMIC DAILY PRN
Qty: 30 ML | Refills: 3 | Status: SHIPPED | OUTPATIENT
Start: 2022-01-01

## 2022-01-01 RX ORDER — IOPAMIDOL 755 MG/ML
100 INJECTION, SOLUTION INTRAVASCULAR ONCE
Status: COMPLETED | OUTPATIENT
Start: 2022-01-01 | End: 2022-01-01

## 2022-01-01 RX ORDER — ACETAMINOPHEN 325 MG/1
650 TABLET ORAL DAILY PRN
Status: ON HOLD | COMMUNITY
End: 2022-01-01

## 2022-01-01 RX ORDER — DOCUSATE SODIUM 100 MG/1
100 CAPSULE, LIQUID FILLED ORAL DAILY
COMMUNITY
End: 2022-01-01

## 2022-01-01 RX ORDER — ENOXAPARIN SODIUM 100 MG/ML
30 INJECTION SUBCUTANEOUS EVERY 24 HOURS
Status: DISCONTINUED | OUTPATIENT
Start: 2022-01-01 | End: 2022-01-01 | Stop reason: HOSPADM

## 2022-01-01 RX ORDER — ASPIRIN 81 MG/1
81 TABLET ORAL DAILY
Qty: 30 TABLET | Refills: 11 | Status: SHIPPED | OUTPATIENT
Start: 2022-01-01 | End: 2023-12-11

## 2022-01-01 RX ORDER — ANTIOX #8/OM3/DHA/EPA/LUT/ZEAX 250-2.5 MG
1 CAPSULE ORAL 2 TIMES DAILY
Qty: 60 CAPSULE | Refills: 11 | Status: SHIPPED | OUTPATIENT
Start: 2022-01-01

## 2022-01-01 RX ORDER — LIDOCAINE 40 MG/G
CREAM TOPICAL
Status: DISCONTINUED | OUTPATIENT
Start: 2022-01-01 | End: 2022-01-01 | Stop reason: HOSPADM

## 2022-01-01 RX ORDER — AMLODIPINE BESYLATE 2.5 MG/1
2.5 TABLET ORAL DAILY
Qty: 90 TABLET | Refills: 3 | Status: SHIPPED | OUTPATIENT
Start: 2022-01-01 | End: 2023-12-11

## 2022-01-01 RX ORDER — LEVOTHYROXINE SODIUM 100 UG/1
100 TABLET ORAL DAILY
Qty: 90 TABLET | Refills: 3 | Status: SHIPPED | OUTPATIENT
Start: 2022-01-01 | End: 2023-12-11

## 2022-01-01 RX ORDER — ACETAMINOPHEN 325 MG/1
325 TABLET ORAL EVERY 4 HOURS PRN
Status: DISCONTINUED | OUTPATIENT
Start: 2022-01-01 | End: 2022-01-01 | Stop reason: HOSPADM

## 2022-01-01 RX ORDER — AMLODIPINE BESYLATE 2.5 MG/1
2.5 TABLET ORAL DAILY
Status: DISCONTINUED | OUTPATIENT
Start: 2022-01-01 | End: 2022-01-01 | Stop reason: HOSPADM

## 2022-01-01 RX ORDER — SUCRALFATE 1 G/1
1 TABLET ORAL 3 TIMES DAILY
Status: ON HOLD | COMMUNITY
End: 2022-01-01

## 2022-01-01 RX ORDER — ENOXAPARIN SODIUM 100 MG/ML
40 INJECTION SUBCUTANEOUS EVERY 24 HOURS
Status: DISCONTINUED | OUTPATIENT
Start: 2022-01-01 | End: 2022-01-01

## 2022-01-01 RX ORDER — LIDOCAINE 4 G/G
1 PATCH TOPICAL
Status: DISCONTINUED | OUTPATIENT
Start: 2022-01-01 | End: 2022-01-01 | Stop reason: HOSPADM

## 2022-01-01 RX ORDER — SILICONE ADHESIVE 1.5" X 3"
1 SHEET (EA) TOPICAL DAILY
Status: ON HOLD | COMMUNITY
End: 2022-01-01

## 2022-01-01 RX ORDER — VIT C/E/ZN/COPPR/LUTEIN/ZEAXAN 60 MG-6 MG
1 CAPSULE ORAL 2 TIMES DAILY
Status: DISCONTINUED | OUTPATIENT
Start: 2022-01-01 | End: 2022-01-01 | Stop reason: HOSPADM

## 2022-01-01 RX ORDER — CEFTRIAXONE 1 G/1
1 INJECTION, POWDER, FOR SOLUTION INTRAMUSCULAR; INTRAVENOUS ONCE
Status: COMPLETED | OUTPATIENT
Start: 2022-01-01 | End: 2022-01-01

## 2022-01-01 RX ORDER — LEVOTHYROXINE SODIUM 100 UG/1
100 TABLET ORAL DAILY
Status: DISCONTINUED | OUTPATIENT
Start: 2022-01-01 | End: 2022-01-01 | Stop reason: HOSPADM

## 2022-01-01 RX ORDER — SUCRALFATE 1 G/1
1 TABLET ORAL 3 TIMES DAILY
Qty: 270 TABLET | Refills: 3 | Status: SHIPPED | OUTPATIENT
Start: 2022-01-01 | End: 2023-12-11

## 2022-01-01 RX ORDER — PIPERACILLIN SODIUM, TAZOBACTAM SODIUM 2; .25 G/10ML; G/10ML
2.25 INJECTION, POWDER, LYOPHILIZED, FOR SOLUTION INTRAVENOUS EVERY 6 HOURS
Status: DISCONTINUED | OUTPATIENT
Start: 2022-01-01 | End: 2022-01-01

## 2022-01-01 RX ADMIN — REMDESIVIR 100 MG: 100 INJECTION, POWDER, LYOPHILIZED, FOR SOLUTION INTRAVENOUS at 21:47

## 2022-01-01 RX ADMIN — ACETAMINOPHEN 650 MG: 325 TABLET, FILM COATED ORAL at 09:29

## 2022-01-01 RX ADMIN — PRAVASTATIN SODIUM 20 MG: 20 TABLET ORAL at 13:57

## 2022-01-01 RX ADMIN — SODIUM CHLORIDE 50 ML: 9 INJECTION, SOLUTION INTRAVENOUS at 00:51

## 2022-01-01 RX ADMIN — ERTAPENEM SODIUM 500 MG: 1 INJECTION, POWDER, LYOPHILIZED, FOR SOLUTION INTRAMUSCULAR; INTRAVENOUS at 15:50

## 2022-01-01 RX ADMIN — ASPIRIN 81 MG: 81 TABLET ORAL at 13:56

## 2022-01-01 RX ADMIN — SUCRALFATE 1 G: 1 TABLET ORAL at 09:28

## 2022-01-01 RX ADMIN — SUCRALFATE 1 G: 1 TABLET ORAL at 13:44

## 2022-01-01 RX ADMIN — Medication 1 CAPSULE: at 20:12

## 2022-01-01 RX ADMIN — ERTAPENEM SODIUM 500 MG: 1 INJECTION, POWDER, LYOPHILIZED, FOR SOLUTION INTRAMUSCULAR; INTRAVENOUS at 16:17

## 2022-01-01 RX ADMIN — NITROGLYCERIN 0.4 MG: 0.4 TABLET SUBLINGUAL at 15:48

## 2022-01-01 RX ADMIN — PRAVASTATIN SODIUM 20 MG: 20 TABLET ORAL at 09:29

## 2022-01-01 RX ADMIN — PRAVASTATIN SODIUM 20 MG: 20 TABLET ORAL at 09:21

## 2022-01-01 RX ADMIN — AMLODIPINE BESYLATE 2.5 MG: 2.5 TABLET ORAL at 09:23

## 2022-01-01 RX ADMIN — LIDOCAINE PATCH 4% 1 PATCH: 40 PATCH TOPICAL at 20:08

## 2022-01-01 RX ADMIN — SODIUM CHLORIDE, PRESERVATIVE FREE 50 ML: 5 INJECTION INTRAVENOUS at 21:48

## 2022-01-01 RX ADMIN — LEVOTHYROXINE SODIUM 100 MCG: 100 TABLET ORAL at 09:23

## 2022-01-01 RX ADMIN — ASPIRIN 81 MG: 81 TABLET ORAL at 09:29

## 2022-01-01 RX ADMIN — LEVOTHYROXINE SODIUM 100 MCG: 100 TABLET ORAL at 13:57

## 2022-01-01 RX ADMIN — SUCRALFATE 1 G: 1 TABLET ORAL at 13:57

## 2022-01-01 RX ADMIN — SODIUM CHLORIDE 1 DROP: 50 SOLUTION/ DROPS OPHTHALMIC at 13:58

## 2022-01-01 RX ADMIN — SUCRALFATE 1 G: 1 TABLET ORAL at 21:14

## 2022-01-01 RX ADMIN — LEVOTHYROXINE SODIUM 100 MCG: 100 TABLET ORAL at 09:28

## 2022-01-01 RX ADMIN — SUCRALFATE 1 G: 1 TABLET ORAL at 20:12

## 2022-01-01 RX ADMIN — AMLODIPINE BESYLATE 2.5 MG: 2.5 TABLET ORAL at 13:57

## 2022-01-01 RX ADMIN — Medication 1 CAPSULE: at 20:00

## 2022-01-01 RX ADMIN — REMDESIVIR 100 MG: 100 INJECTION, POWDER, LYOPHILIZED, FOR SOLUTION INTRAVENOUS at 21:16

## 2022-01-01 RX ADMIN — LIDOCAINE PATCH 4% 1 PATCH: 40 PATCH TOPICAL at 21:22

## 2022-01-01 RX ADMIN — Medication 1 CAPSULE: at 09:23

## 2022-01-01 RX ADMIN — ERTAPENEM SODIUM 500 MG: 1 INJECTION, POWDER, LYOPHILIZED, FOR SOLUTION INTRAMUSCULAR; INTRAVENOUS at 11:47

## 2022-01-01 RX ADMIN — AMLODIPINE BESYLATE 2.5 MG: 2.5 TABLET ORAL at 09:29

## 2022-01-01 RX ADMIN — Medication 1 CAPSULE: at 13:56

## 2022-01-01 RX ADMIN — ASPIRIN 81 MG: 81 TABLET ORAL at 09:23

## 2022-01-01 RX ADMIN — IOPAMIDOL 100 ML: 755 INJECTION, SOLUTION INTRAVENOUS at 17:48

## 2022-01-01 RX ADMIN — ENOXAPARIN SODIUM 30 MG: 30 INJECTION SUBCUTANEOUS at 13:44

## 2022-01-01 RX ADMIN — REMDESIVIR 200 MG: 100 INJECTION, POWDER, LYOPHILIZED, FOR SOLUTION INTRAVENOUS at 22:19

## 2022-01-01 RX ADMIN — SODIUM CHLORIDE, PRESERVATIVE FREE 50 ML: 5 INJECTION INTRAVENOUS at 22:00

## 2022-01-01 RX ADMIN — CEFTRIAXONE SODIUM 1 G: 1 INJECTION, POWDER, FOR SOLUTION INTRAMUSCULAR; INTRAVENOUS at 18:16

## 2022-01-01 RX ADMIN — SUCRALFATE 1 G: 1 TABLET ORAL at 09:35

## 2022-01-01 RX ADMIN — PIPERACILLIN SODIUM AND TAZOBACTAM SODIUM 2.25 G: 2; .25 INJECTION, POWDER, LYOPHILIZED, FOR SOLUTION INTRAVENOUS at 10:24

## 2022-01-01 RX ADMIN — Medication 1 CAPSULE: at 09:28

## 2022-01-01 RX ADMIN — LIDOCAINE PATCH 4% 1 PATCH: 40 PATCH TOPICAL at 22:19

## 2022-01-01 RX ADMIN — ENOXAPARIN SODIUM 30 MG: 30 INJECTION SUBCUTANEOUS at 11:47

## 2022-01-01 ASSESSMENT — ENCOUNTER SYMPTOMS
DIARRHEA: 0
FEVER: 0
PALPITATIONS: 0
BLOOD IN STOOL: 0
NECK PAIN: 0
FATIGUE: 0
SHORTNESS OF BREATH: 0
NAUSEA: 1
HEADACHES: 0
BACK PAIN: 0
CONSTIPATION: 0
NECK STIFFNESS: 0
ABDOMINAL PAIN: 0
NUMBNESS: 0
EYES NEGATIVE: 1
COUGH: 0
LIGHT-HEADEDNESS: 0
WEAKNESS: 0
VOMITING: 0

## 2022-01-01 ASSESSMENT — ACTIVITIES OF DAILY LIVING (ADL)
ADLS_ACUITY_SCORE: 43
ADLS_ACUITY_SCORE: 41
ADLS_ACUITY_SCORE: 38
ADLS_ACUITY_SCORE: 43
ADLS_ACUITY_SCORE: 38
ADLS_ACUITY_SCORE: 41
ADLS_ACUITY_SCORE: 45
ADLS_ACUITY_SCORE: 38
ADLS_ACUITY_SCORE: 43
ADLS_ACUITY_SCORE: 35
ADLS_ACUITY_SCORE: 43
ADLS_ACUITY_SCORE: 35
ADLS_ACUITY_SCORE: 41
ADLS_ACUITY_SCORE: 43
ADLS_ACUITY_SCORE: 41
ADLS_ACUITY_SCORE: 45
ADLS_ACUITY_SCORE: 38
ADLS_ACUITY_SCORE: 35
ADLS_ACUITY_SCORE: 43
ADLS_ACUITY_SCORE: 39
ADLS_ACUITY_SCORE: 41
ADLS_ACUITY_SCORE: 45
ADLS_ACUITY_SCORE: 38
ADLS_ACUITY_SCORE: 39
ADLS_ACUITY_SCORE: 43
ADLS_ACUITY_SCORE: 45
ADLS_ACUITY_SCORE: 41
ADLS_ACUITY_SCORE: 41
ADLS_ACUITY_SCORE: 45
ADLS_ACUITY_SCORE: 43
ADLS_ACUITY_SCORE: 38
ADLS_ACUITY_SCORE: 43
ADLS_ACUITY_SCORE: 43
ADLS_ACUITY_SCORE: 35

## 2022-02-28 ENCOUNTER — LAB REQUISITION (OUTPATIENT)
Dept: LAB | Facility: CLINIC | Age: 87
End: 2022-02-28
Payer: COMMERCIAL

## 2022-02-28 DIAGNOSIS — N18.31 CHRONIC KIDNEY DISEASE, STAGE 3A (H): ICD-10-CM

## 2022-02-28 DIAGNOSIS — E87.5 HYPERKALEMIA: ICD-10-CM

## 2022-02-28 DIAGNOSIS — E83.42 HYPOMAGNESEMIA: ICD-10-CM

## 2022-02-28 DIAGNOSIS — I10 ESSENTIAL (PRIMARY) HYPERTENSION: ICD-10-CM

## 2022-03-02 ENCOUNTER — LAB REQUISITION (OUTPATIENT)
Dept: LAB | Facility: CLINIC | Age: 87
End: 2022-03-02
Payer: COMMERCIAL

## 2022-03-02 DIAGNOSIS — N18.31 CHRONIC KIDNEY DISEASE, STAGE 3A (H): ICD-10-CM

## 2022-03-02 LAB
ALBUMIN SERPL-MCNC: 3.4 G/DL (ref 3.5–5)
ALBUMIN UR-MCNC: NEGATIVE MG/DL
ANION GAP SERPL CALCULATED.3IONS-SCNC: 11 MMOL/L (ref 5–18)
APPEARANCE UR: CLEAR
BILIRUB UR QL STRIP: NEGATIVE
BUN SERPL-MCNC: 25 MG/DL (ref 8–28)
CALCIUM SERPL-MCNC: 9.6 MG/DL (ref 8.5–10.5)
CHLORIDE BLD-SCNC: 106 MMOL/L (ref 98–107)
CO2 SERPL-SCNC: 22 MMOL/L (ref 22–31)
COLOR UR AUTO: ABNORMAL
CREAT SERPL-MCNC: 1.2 MG/DL (ref 0.6–1.1)
ERYTHROCYTE [DISTWIDTH] IN BLOOD BY AUTOMATED COUNT: 13.3 % (ref 10–15)
GFR SERPL CREATININE-BSD FRML MDRD: 42 ML/MIN/1.73M2
GLUCOSE BLD-MCNC: 116 MG/DL (ref 70–125)
GLUCOSE UR STRIP-MCNC: NEGATIVE MG/DL
HBA1C MFR BLD: 5.7 %
HCT VFR BLD AUTO: 40.5 % (ref 35–47)
HGB BLD-MCNC: 12.4 G/DL (ref 11.7–15.7)
HGB UR QL STRIP: NEGATIVE
KETONES UR STRIP-MCNC: NEGATIVE MG/DL
LEUKOCYTE ESTERASE UR QL STRIP: ABNORMAL
MCH RBC QN AUTO: 30 PG (ref 26.5–33)
MCHC RBC AUTO-ENTMCNC: 30.6 G/DL (ref 31.5–36.5)
MCV RBC AUTO: 98 FL (ref 78–100)
NITRATE UR QL: NEGATIVE
PH UR STRIP: 6 [PH] (ref 5–7)
PHOSPHATE SERPL-MCNC: 3.1 MG/DL (ref 2.5–4.5)
PLATELET # BLD AUTO: 295 10E3/UL (ref 150–450)
POTASSIUM BLD-SCNC: 4.7 MMOL/L (ref 3.5–5)
RBC # BLD AUTO: 4.14 10E6/UL (ref 3.8–5.2)
RBC URINE: <1 /HPF
SODIUM SERPL-SCNC: 139 MMOL/L (ref 136–145)
SP GR UR STRIP: 1.01 (ref 1–1.03)
SQUAMOUS EPITHELIAL: 1 /HPF
UROBILINOGEN UR STRIP-MCNC: <2 MG/DL
WBC # BLD AUTO: 6.4 10E3/UL (ref 4–11)
WBC URINE: 7 /HPF

## 2022-03-02 PROCEDURE — 81001 URINALYSIS AUTO W/SCOPE: CPT | Mod: ORL | Performed by: INTERNAL MEDICINE

## 2022-03-02 PROCEDURE — 80069 RENAL FUNCTION PANEL: CPT | Mod: ORL | Performed by: INTERNAL MEDICINE

## 2022-03-02 PROCEDURE — 85027 COMPLETE CBC AUTOMATED: CPT | Mod: ORL | Performed by: INTERNAL MEDICINE

## 2022-03-02 PROCEDURE — 87086 URINE CULTURE/COLONY COUNT: CPT | Mod: ORL | Performed by: INTERNAL MEDICINE

## 2022-03-02 PROCEDURE — 83036 HEMOGLOBIN GLYCOSYLATED A1C: CPT | Mod: ORL | Performed by: INTERNAL MEDICINE

## 2022-03-02 PROCEDURE — P9604 ONE-WAY ALLOW PRORATED TRIP: HCPCS | Mod: ORL | Performed by: INTERNAL MEDICINE

## 2022-03-02 PROCEDURE — 36415 COLL VENOUS BLD VENIPUNCTURE: CPT | Mod: ORL | Performed by: INTERNAL MEDICINE

## 2022-03-04 LAB — BACTERIA UR CULT: NORMAL

## 2022-04-15 ENCOUNTER — LAB REQUISITION (OUTPATIENT)
Dept: LAB | Facility: CLINIC | Age: 87
End: 2022-04-15
Payer: COMMERCIAL

## 2022-04-15 DIAGNOSIS — R30.0 DYSURIA: ICD-10-CM

## 2022-04-15 LAB
ALBUMIN UR-MCNC: NEGATIVE MG/DL
APPEARANCE UR: CLEAR
BILIRUB UR QL STRIP: NEGATIVE
COLOR UR AUTO: ABNORMAL
GLUCOSE UR STRIP-MCNC: NEGATIVE MG/DL
HGB UR QL STRIP: NEGATIVE
KETONES UR STRIP-MCNC: NEGATIVE MG/DL
LEUKOCYTE ESTERASE UR QL STRIP: ABNORMAL
NITRATE UR QL: NEGATIVE
PH UR STRIP: 6 [PH] (ref 5–7)
RBC URINE: 2 /HPF
SP GR UR STRIP: 1.02 (ref 1–1.03)
SQUAMOUS EPITHELIAL: 1 /HPF
UROBILINOGEN UR STRIP-MCNC: <2 MG/DL
WBC URINE: 56 /HPF

## 2022-04-15 PROCEDURE — 81001 URINALYSIS AUTO W/SCOPE: CPT | Mod: ORL | Performed by: FAMILY MEDICINE

## 2022-04-15 PROCEDURE — 87086 URINE CULTURE/COLONY COUNT: CPT | Mod: ORL | Performed by: FAMILY MEDICINE

## 2022-04-17 LAB — BACTERIA UR CULT: NO GROWTH

## 2022-06-21 ENCOUNTER — LAB REQUISITION (OUTPATIENT)
Dept: LAB | Facility: CLINIC | Age: 87
End: 2022-06-21
Payer: COMMERCIAL

## 2022-06-21 DIAGNOSIS — E89.0 POSTPROCEDURAL HYPOTHYROIDISM: ICD-10-CM

## 2022-06-22 LAB — TSH SERPL DL<=0.005 MIU/L-ACNC: 1.31 UIU/ML (ref 0.3–5)

## 2022-06-22 PROCEDURE — 84443 ASSAY THYROID STIM HORMONE: CPT | Mod: ORL

## 2022-06-22 PROCEDURE — 36415 COLL VENOUS BLD VENIPUNCTURE: CPT | Mod: ORL

## 2022-06-22 PROCEDURE — P9604 ONE-WAY ALLOW PRORATED TRIP: HCPCS | Mod: ORL

## 2022-07-13 ENCOUNTER — LAB REQUISITION (OUTPATIENT)
Dept: LAB | Facility: CLINIC | Age: 87
End: 2022-07-13
Payer: COMMERCIAL

## 2022-07-13 DIAGNOSIS — R35.0 FREQUENCY OF MICTURITION: ICD-10-CM

## 2022-07-13 LAB
ALBUMIN UR-MCNC: NEGATIVE MG/DL
APPEARANCE UR: CLEAR
BILIRUB UR QL STRIP: NEGATIVE
COLOR UR AUTO: ABNORMAL
GLUCOSE UR STRIP-MCNC: NEGATIVE MG/DL
HGB UR QL STRIP: NEGATIVE
KETONES UR STRIP-MCNC: NEGATIVE MG/DL
LEUKOCYTE ESTERASE UR QL STRIP: ABNORMAL
NITRATE UR QL: NEGATIVE
PH UR STRIP: 6 [PH] (ref 5–7)
RBC URINE: 0 /HPF
SP GR UR STRIP: 1.02 (ref 1–1.03)
SQUAMOUS EPITHELIAL: 1 /HPF
TRANSITIONAL EPI: <1 /HPF
UROBILINOGEN UR STRIP-MCNC: NORMAL MG/DL
WBC URINE: 9 /HPF

## 2022-07-13 PROCEDURE — 81001 URINALYSIS AUTO W/SCOPE: CPT | Mod: ORL | Performed by: REGISTERED NURSE

## 2022-08-18 ENCOUNTER — LAB REQUISITION (OUTPATIENT)
Dept: LAB | Facility: CLINIC | Age: 87
End: 2022-08-18
Payer: COMMERCIAL

## 2022-08-18 DIAGNOSIS — E89.0 POSTPROCEDURAL HYPOTHYROIDISM: ICD-10-CM

## 2022-08-19 LAB — TSH SERPL DL<=0.005 MIU/L-ACNC: 5.1 UIU/ML (ref 0.3–4.2)

## 2022-08-19 PROCEDURE — 84443 ASSAY THYROID STIM HORMONE: CPT | Mod: ORL | Performed by: INTERNAL MEDICINE

## 2022-08-19 PROCEDURE — 36415 COLL VENOUS BLD VENIPUNCTURE: CPT | Mod: ORL | Performed by: INTERNAL MEDICINE

## 2022-08-19 PROCEDURE — P9604 ONE-WAY ALLOW PRORATED TRIP: HCPCS | Mod: ORL | Performed by: INTERNAL MEDICINE

## 2022-12-13 PROBLEM — R07.9 CHEST PAIN, UNSPECIFIED TYPE: Status: ACTIVE | Noted: 2022-01-01

## 2022-12-13 NOTE — ED TRIAGE NOTES
Triage Assessment & Note:    There were no vitals taken for this visit.      Patient presents with: Pt BIBA ([facility]) from care facility with chest pain. No reports of fever, cough, SOB, or travel.   18 R  Nitroglycerin 160 down to 140  asprin     Home Treatments/Remedies: Home medications    Febrile / Afebrile: afebrile    Duration of C/o: < 24 hrs    Jazmin Garsia RN  December 13, 2022

## 2022-12-13 NOTE — ED PROVIDER NOTES
ED Provider Note  [facility]      History     Chief Complaint   Patient presents with     Chest Pain     HPI  Latricia Davis is a 89 year old female who PMH notable for DM, HTN, HLD, GERD, malnutrition, prior electrolyte abnormalities, et al., who presents today w/ reports of chest pressure/discomfort.    Lives in the apartment side of a complex (that also has assisted/additional care capabilities, which she reports she has not yet needed historically). Patient reports she has been vaccinated, and at some point recently at her facility had a COVID test that was negative. Presents today via EMS w/ report of Chest pain. x1 day.  En route she was given aspirin and nitroglycerin (w/ decrease of BP from 160s to 140's systolic).     Patient brought in by ambulance reporting CP that she describes as a pressure. Says is not sharp. Not radiating. CP was central and left-sided CP. No radiation to neck, shoulders, back or abdomen. No cough or shortness of breath. No fevers, myalgias or other infectious type symptoms. No nausea or vomiting. No rashes or skin changes. No MSK pain or sx's. No new leg pain or swelling. No HAs, vision changes or other HEENT sx's. No neck pain or stiffness. No new back sx's.  No traumas or falls. No other symptoms or complaints at this time. Please see ROS for further details.        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    Allergies   Allergen Reactions     Codeine Other (See Comments)     Hard to wake up     Sulfa Drugs Nausea     Family History  Family History   Problem Relation Age of Onset     Breast Cancer Mother      Diabetes Mother      Cancer Father      Social History   Social History     Tobacco Use     Smoking status: Never     Smokeless tobacco: Never   Substance Use Topics     Alcohol use: Yes     Comment: Alcoholic Drinks/day: rare occasion 2 glass wine per year     Drug use: No      Past medical history, past surgical history, medications, allergies, family history, and social history were reviewed with the patient. No additional pertinent items.       Review of Systems   Constitutional: Negative for fatigue and fever.   HENT: Negative.    Eyes: Negative.    Respiratory: Negative for cough and shortness of breath.    Cardiovascular: Positive for chest pain. Negative for palpitations and leg swelling.   Gastrointestinal: Positive for nausea. Negative for abdominal pain, blood in stool, constipation, diarrhea and vomiting.   Genitourinary: Negative.    Musculoskeletal: Negative for back pain, neck pain and neck stiffness.   Skin: Negative.    Neurological: Negative for syncope, weakness, light-headedness, numbness and headaches.   Psychiatric/Behavioral: Negative for suicidal ideas.   All other systems reviewed and are negative.    A complete review of systems was performed with pertinent positives and negatives noted in the HPI, and all other systems negative.    Physical Exam   BP: (!) 149/67  Pulse: 58  Temp: 98  F (36.7  C)  Resp: 14  SpO2: 99 %  Physical Exam  CONSTITUTIONAL: Well-developed and well-nourished. Awake and alert. Non-toxic appearance. No acute distress.   HENT:   - Head: Normocephalic and atraumatic.   - Ears:  External ear grossly normal.   - Nose: Nose normal. No rhinorrhea. No epistaxis.   - Mouth/Throat: MMM  EYES: Conjunctivae and lids are normal. No scleral icterus.   NECK: Normal range of motion and phonation normal. Neck supple.  No tracheal deviation, no stridor. No edema or erythema noted.  CARDIOVASCULAR: HR in the high 50's, regular rhythm and no appreciable abnormal heart sounds.  PULMONARY/CHEST: Normal work of breathing. No accessory muscle usage or stridor. No respiratory distress.  No appreciable abnormal breath sounds.  ABDOMEN: Soft, non-distended. No tenderness. No peritoneal findings, no rigidity, rebound or guarding.  No palpable masses or abnormal pulsatility appreciated.  MUSCULOSKELETAL: Extremities warm and seemingly well perfused. No edema or calf tenderness.  NEUROLOGIC: Awake, alert. Not disoriented. No seizure activity. GCS 15  SKIN: Skin is warm and dry. No rash noted. No diaphoresis. No pallor.   PSYCHIATRIC: Normal mood and affect. Speech and behavior normal. Thought processes linear. Cognition and memory are normal. No SI/HI reported.    ED Course     ED Course as of 12/13/22 1911   Tue Dec 13, 2022   1632 Attempted to call patient's daughter earlier as per her request.  With patient's permission left message   1655 Awaiting call back from Cardiology   1725 Paging Cards again   1737 Spoke with Cardiology attending.  They recommend waiting for the CT scan, and if there is lung pathology to explain some chest symptoms (given the patient being found to be COVID-positive) they would recommend admission to Medicine.  If there is no obvious lung pathology, they will admit for further evaluation and management.  Appreciate their time and discussion of the patient.   1908 Discussed CT w/ Rads. Perhaps some subtle CT findings in the lungs that would go along w/ COVID + test, but no significant pulm/lung findings.             EKG Interpretation: Performed 15:20pm     Interpreted by Sandy Agrawal,  MD  Time reviewed: 1525 (performed 1520pm)  Symptoms at time of EKG: Chest pain   Rhythm: sinus bradycardia  Rate: Bradycardia  Axis: Right Axis Deviation  Ectopy: none  Conduction: RBBB, 1st degree AV Block (OK 202ms)  ST Segments/ T Waves: Changes associated with RBBB, no fei ST elevation/depression or any findings that look like reciprocal changes, and looks generally similar to previous  Comparison to prior: vs. 30 Jul 2021; RBBB noted previously.  HR somewhat decreased, otherwise generally similar appearance to prior  Clinical Impression: Sinus bradycardia with RBBB, morphology looking generally similar to prior without obvious ST elevation/depression or clear reciprocal changes           EKG Interpretation: Performed 1628pm     Interpreted by Sandy Agrawal MD  Time reviewed: 1635 (performed 1628pm)  Symptoms at time of EKG: Chest pain   Rhythm: sinus bradycardia  Rate: Bradycardia  Axis: Right Axis Deviation  Conduction: RBBB, 1st degree AV Block (OK 222ms, OK intervals seem consistent)  ST Segments/ T Waves: Changes associated with RBBB, no fei ST elevation/depression or any findings that look like reciprocal changes, and looks generally similar to previous  Comparison to prior: vs. About an hour prior today; Still has bradycardia, RBBB, and 1st degree AV block   Clinical Impression: Sinus bradycardia with RBBB, morphology looking generally similar to prior without obvious ST elevation/depression or clear reciprocal changes       Assessments & Plan (with Medical Decision Making)   IMPRESSION:   89 year old female w/ PMH notable for  DM, HTN, HLD, GERD, malnutrition, prior electrolyte abnormalities, et al., who presents today w/ reports of chest pressure/discomfort. Clinically, patient appears nontoxic, NAD, vitals WNL. Otherwise on examination, no obvious acute cardiopulmonary abnormalities. No notable HF findings. Protecting airway. No other obvious acute/emergent findings.    Ddx includes, but not  limited to, ACS (which I am suspicious of and for which she does have multiple risk factors), Respiratory infection (viral, PNA, etc), Sounds less consistent w/ dissection or PE, PTX, pericarditis/pericardial effusion. No vomiting for prasad [place] or boerrhave. Considered gastritis/PUD, esophagitis, hepatobiliary cause, pancreatitis, et al.     PLAN:   - Already received aspiring and nitroglycerin from EMS en route  - Will continue to monitor pain/symptom management as needed  - ECG, Labs, chest imaging  - Risks/benefits of pursuing imaging reviewed and accepted.   - Likely admission given age, risk factors and symptoms/presentation    RESULTS:  ECG: Sinus bradycardia, RBBB, HR a bit slower, otherwise looks generally similar to previous  Labs: Troponin elevated but not significantly increased  Urine: Does appear to have UTI, Cx pending  Imaging: Waiting on finalized reports. No PE or dissection. Did have some ground glass opacities that they think would be related to the newly diagnosed COVID  Results/reports reviewed w/ patient who expresses understanding of findings and F/U recommendations.    INTERVENTIONS:   - EMS Medications: Aspirin, Nitroglycerin  - Nitrogylcerin  - IV Ceftriaxone  - Discussion w/ Cardiology    RE-EVALUATION:  - The patient's symptoms were somewhat improved  - Repeat troponin still elevated, but not significantly increased  - Pt otherwise continues to do well here in the ED, no acute issues or apparent concerning changes in vitals or clinical appearance.    DISCUSSIONS:  - w/ Cardiology: Discussed with Cardiology attending.  They believe the patient can be admitted to Medicine service though they can be consulted by IM if the patient has recurrent chest pain tonight.  Otherwise they would recommend an echocardiogram tomorrow as part of work-up.  No other immediate recommendations from their standpoint.  - w/ IM: Reviewed patient/presentation, current state of workup/any pending studies. They  will admit for further evaluation/management, F/U pending studies as needed, coordinate w/ consulting services as needed. No additional requests/recommendations for workup/management for in the ED at this time.    - w/ Patient: I have reviewed the available findings, plan with the patient. She expressed understanding and agreement with this plan. All questions answered to the best of our ability at this time.       DISPOSITION/PLANNING:  IMPRESSION:   - Chest pain  - Positive troponin  - COVID-19 Positive  - UTI  - Mild Cr increase  DISPOSITION:  - Admit to IM for further evaluation and management  PENDING:   - Finalize imaging reports  OTHER RECOMMENDATIONS:   - Keep on cardiac monitor   - echocardiogram  - Cardiology consult as needed    ______________________________________________________________________        --  Sandy Agrawal MD  [facility] EMERGENCY DEPARTMENT  12/13/2022     Sandy Agrawal MD  12/14/22 0503

## 2022-12-13 NOTE — ED NOTES
Bed: [facility]  Expected date: 12/13/22  Expected time: 2:41 PM  Means of arrival: Ambulance  Comments:  [address]  89F  Chest pain, EKG, asprin, nitroglycerin given

## 2022-12-14 NOTE — UTILIZATION REVIEW
"[facility]    Admission Status; Secondary Review Determination     Admission Date: 12/13/2022  2:48 PM      Under the authority of the Utilization Management Committee, the utilization review process indicated a secondary review on the above patient.  The review outcome is based on review of the medical records, discussions with staff, and applying clinical experience noted on the date of the review.          (x) Observation Status Appropriate - This patient does not meet hospital inpatient criteria and is placed in observation status. If this patient's primary payer is Medicare and was admitted as an inpatient, Condition Code 44 should be used and patient status changed to \"observation\".     RATIONALE FOR DETERMINATION   89-year-old female with a history of diabetes, hypertension, goiter, hypothyroidism, and dementia was admitted yesterday with chest pain, COVID-19, and urinary tract infection.  CRP was undetectable.  D-dimer was elevated at 1.8.  CT of the chest showed subtle bibasilar groundglass opacities.  The case was discussed with infectious disease who recommended remdesivir given her age and comorbidities although they did not recommend anticoagulation.  Steroids were not initiated.  Patient is not hypoxic.  There is concern for urinary tract infection the patient was started on IV antibiotics.  Urine culture is growing gram-negative rods.  She is not septic by criteria.  Troponin levels were mildly elevated but echocardiogram shows preserved ejection fraction and no pericardial effusion.  At the time of this review the patient does not meet medical necessity for inpatient hospitalization and observation status is recommended.    The severity of illness, intensity of service provided, expected LOS and risk for adverse outcome make the care appropriate for further observation; however, doesn't meet criteria for hospital inpatient admission.  Dr Owens notified of this determination.        The information on this " document is developed by the utilization review team in order for the business office to ensure compliance.  This only denotes the appropriateness of proper admission status and does not reflect the quality of care rendered.         The definitions of Inpatient Status and Observation Status used in making the determination above are those provided in the CMS Coverage Manual, Chapter 1 and Chapter 6, section 70.4.      Sincerely,     William Bustamante DO MPH   Physician Advisor  Utilization Review  [facility]

## 2022-12-14 NOTE — PROGRESS NOTES
Major Shift Events:       Neuro:  Neuro status intact. Can become anxious but easily calmed and redirected.   Resp: Lungs clear on room air. Covid positive. Lidocaine patch to right chest (pain) due to coughing.  Cards: Hemodynamically stable.  GI/: Regular diet. No nausea. Voiding adequate amounts. Up to commode.  Plan:  Continue to closely monitor.  For vital signs and complete assessments, please see documentation flowsheets.

## 2022-12-14 NOTE — PHARMACY-ADMISSION MEDICATION HISTORY
Admission Medication History Completed by Pharmacy    See [facility] Admission Navigator for allergy information, preferred outpatient pharmacy, prior to admission medications and immunization status.     Medication History Sources:     MAR ([employer] senior services) , patient    Changes made to PTA medication list (reason):    Added: All - See PTA medication list    Deleted: None    Changed: None    Additional Information:    Per [employer] staff pt manages her own medications without assistance. Patient is a good historian overall and recognizes regimen and time of last doses.    MAR notes aspirin 81 mg EC tablet - 1 PO daily PRN (no indication). Pt reports taking 1 tablet daily on a scheduled basis and not PRN.    MAR notes docusate sodium 100 mg capsule - 1 PO daily. Patient reports that she does not take any laxatives. Patient was unable to recognize brand or generic name or description of capsules.    Prior to Admission medications    Medication Sig Last Dose Taking? Auth Provider Long Term End Date   acetaminophen (TYLENOL) 325 MG tablet Take 650 mg by mouth daily as needed for mild pain Unknown at Unknown Yes Unknown, Entered By History     amLODIPine (NORVASC) 2.5 MG tablet Take 2.5 mg by mouth daily 12/13/2022 at AM Yes Unknown, Entered By History Yes    aspirin 81 MG EC tablet Take 81 mg by mouth daily 12/13/2022 at AM Yes Unknown, Entered By History     glycerin-hypromellose- (ARTIFICIAL TEARS) 0.2-0.2-1 % SOLN ophthalmic solution Place 1 drop into both eyes daily as needed for dry eyes Unknown at Unknown Yes Unknown, Entered By History     levothyroxine (SYNTHROID/LEVOTHROID) 100 MCG tablet Take 100 mcg by mouth daily Before breakfast 12/13/2022 at AM Yes Unknown, Entered By History Yes    Multiple Vitamins-Minerals (PRESERVISION AREDS 2) CAPS Take 1 capsule by mouth 2 times daily 12/13/2022 at AM Yes Unknown, Entered By History     pravastatin (PRAVACHOL) 20 MG tablet Take 20 mg by mouth daily  12/12/2022 at PM Yes Unknown, Entered By History Yes    sodium chloride (NAKIA 128) 5 % ophthalmic solution Place 1 drop into both eyes daily 1-2 times daily 12/12/2022 at PM Yes Unknown, Entered By History     sucralfate (CARAFATE) 1 GM tablet Take 1 g by mouth 3 times daily 12/13/2022 at AM; 1 dose/3 Yes Unknown, Entered By History         Date completed: 12/14/22    Medication history completed by: Usha Sorto            Date completed: 12/14/22    Medication history completed by: Usha Sorto

## 2022-12-14 NOTE — PROGRESS NOTES
"Elizabeth 2 paged for UC results are \"gram negative bacili\". Is zosyn still compatible?    " PAST SURGICAL HISTORY:  History of elective

## 2022-12-14 NOTE — H&P
[facility]    History and Physical - Medicine Service, [facility] TEAM        Date of Admission:  12/13/2022    Assessment & Plan      Latricia Davis is a 89 year old female admitted on 12/13/2022. She has a history of DM, HTN, Hx of toxic multinodular goiter, hypothyroidism (post ablation), HLD, dementia who is admitted for chest pain, COVID-19, and UTI.    # Confirmed COVID-19 infection         Symptom Onset Unknown   Date of 1st Positive Test 12/13/2022   Vaccination Status Fully Vaccinated       Fully vaccinated. Tested positive on 12/13/2022, no sick contact. Asymptomatic, satting 96-99% room air. CT chest showed subtle bibasilar ground glass opacities. CRP < 3, D-dimer 1.78. Discussed with ID, agrees with remdesivir given her age and comorbidities however they did not recommend anticoagulation. No indications for steroids.  - started remdesivir  - monitor respiratory symptoms and SpO2    # Chest pain  Right chest pain related to position with point of tenderness. EKG showed RBBB. Trop 38->39. Differential diagnosis includes musculoskeletal pain vs NSTEMI vs pneumonia but leaning towards musculoskeletal etiology given her intermittent chest pain that is related to position, not precipitated by exertion or respiration. Discussed with cardiology, would like to obtain TTE.  - will obtain 3rd Trop T to make sure it is trending down  - TTE ordered  - on cardiac tele  - lidocaine patch   - tylenol prn    # UTI  Patient endorse dysuria yesterday but asymptomatic currently. No fever, abdominal tenderness or CVA tenderness. Patient had Hx of UTI in the past, last urine culture in 12/2021 positive for ESBL.  - ceftriaxone IV given in the ED  - will defer abx plan to primary team     Chronic stable issue    # Hypothyroid (post ablation)  - will check TSH  - could resume PTA levothyroxine (await for med recs)    # HTN  PTA was on amlodipine  - hold antiHTN for now  - monitor  BP    # DM  Not on any meds PTA. Glucose ~ 100s    # CKD with TIKA  Baseline 1.0, today 1.4. Most likely prerenal  - encourage oral fluid  - recheck Cr tmr    Diet: Combination Diet Regular Diet Adult    DVT Prophylaxis: Low Risk/Ambulatory with no VTE prophylaxis indicated  Devine Catheter: Not present  Fluids: orally  Central Lines: None  Cardiac Monitoring: None  Code Status: No CPR- Do NOT Intubate                               Disposition Plan  Pending on clinical course. Most likely to previous living facility.     Expected Discharge Date: 12/15/2022                The patient's care was discussed with the Attending Physician, Dr. Monroe.    Aneta Haskins MD  Medicine Service, [facility]  Securely message with the Vocera Web Console (learn more here)  Text page via [facility] Paging/Directory   Please see signed in provider for up to date coverage information    ______________________________________________________________________    Chief Complaint   Chest pain    History is obtained from the patient    History of Present Illness   Latricia Davis is a 89 year old female with history of DM, HTN, Hx of toxic multinodular goiter, hypothyroidism (post ablation), HLD, dementia who came from an assisted living facility and is presented here due to chest pain.    Patient is a poor historian due to her cognitive function. Patient states that she started to have chest pain yesterday. She described it as pressure-like at her right chest. The pain became more prominent when she did exact position, mostly when she bend her back to the left and it would be gone when she sit up. PS~2-3. She also reports that she had the pain more when she poked on her chest.  She is able to walk around her apartment with walker with no recent changes in functional status. Denies orthopnea, PND. Uses 1 pillow during at night. Denies palpitation, shortness of breath, cough,  runny nose, fever, abdominal pain. Endorses dysuria yesterday but does not have any symptoms today.     Review of Systems    The 10 point Review of Systems is negative other than noted in the HPI.    Past Medical History    I have reviewed this patient's medical history and updated it with pertinent information if needed.   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  Past Surgical History:   Procedure Laterality Date     CATARACT EXTRACTION Left      DILATION AND CURETTAGE      twice     DILATION AND CURETTAGE      x 2     OTHER SURGICAL HISTORY Left     CYST REMOVED FROM ABOVE EYE     VA ESOPHAGOGASTRODUODENOSCOPY TRANSORAL DIAGNOSTIC N/A 6/5/2019    Procedure: ESOPHAGOGASTRODUODENOSCOPY (EGD);  Surgeon: Nikkie Moss MD;  Location: [facility];  Service: Gastroenterology       Social History   I have reviewed this patient's social history and updated it with pertinent information if needed. Latricia Davis  reports that she has never smoked. She has never used smokeless tobacco. She reports current alcohol use. She reports that she does not use drugs.    Family History   I have reviewed this patient's family history and updated it with pertinent information if needed.  Family History   Problem Relation Age of Onset     Breast Cancer Mother      Diabetes Mother      Cancer Father        Prior to Admission Medications   None     Allergies   Allergies   Allergen Reactions     Codeine Other (See Comments)     Hard to wake up     Sulfa Drugs Nausea       Physical Exam   Vital Signs: Temp: 98  F (36.7  C) Temp src: Oral BP: (!) 149/67  Pulse: 58   Resp: 14 SpO2: 99 % O2 Device: None (Room air)    Weight: 0 lbs 0 oz    Constitutional: awake, alert, cooperative, no apparent distress, and appears stated age  Eyes: Lids and lashes normal, pupils equal, round and reactive to light, extra ocular muscles intact, sclera clear, conjunctiva normal  ENT: Normocephalic, without obvious abnormality, atraumatic  Hematologic / Lymphatic: no cervical lymphadenopathy  Respiratory: No increased work of breathing, good air exchange, clear to auscultation bilaterally, no crackles or wheezing  Cardiovascular: Normal apical impulse, regular rate and rhythm, normal S1 and S2, no S3 or S4, and no murmur noted  GI: No scars, normal bowel sounds, soft, non-distended, non-tender, no masses palpated, no hepatosplenomegally  Skin: no bruising or bleeding  Musculoskeletal: There is no redness, warmth, or swelling of the joints.  Full range of motion noted. Tone is normal.  Neurologic: Not oriented to plave. Cranial nerves II-XII are grossly intact.  Motor is 5 out of 5 bilaterally.    Neuropsychiatric: General: normal, calm and normal eye contact    Data   Data reviewed today: I reviewed all medications, new labs and imaging results over the last 24 hours. I personally reviewed the EKG tracing showing sinus bradycardia and RBBB.    Recent Labs   Lab 12/13/22 2139 12/13/22 2006 12/13/22 1957 12/13/22  1937 12/13/22  1700 12/13/22  1645 12/13/22  1642 12/13/22  1514   WBC 7.3  --   --   --   --   --   --  7.3   HGB 12.6  --   --   --   --   --   --  13.0   MCV 98  --   --   --   --   --   --  98     --   --   --   --   --   --  363   INR  --  1.06  --   --   --   --   --  1.01   NA  --   --   --   --  137  --   --  138   POTASSIUM  --   --   --   --  4.9  --   --  4.6   CHLORIDE  --   --   --   --  104  --   --  103   CO2  --   --   --   --  18*  --   --  19*   BUN  --   --   --   --  33.0*  --   --  29.3*   CR  --   --   --   --  1.22* 1.4*  --  1.23*   ANIONGAP  --    --   --   --  15  --   --  16*   TONNY  --   --   --   --  9.8*  --   --  9.5   GLC  --   --  103* 109* 95  --    < > 122*   ALBUMIN  --   --   --   --  3.4*  --   --  3.6   PROTTOTAL  --   --   --   --  6.7  --   --  7.1   BILITOTAL  --   --   --   --  0.3  --   --  0.2   ALKPHOS  --   --   --   --  55  --   --  59   ALT  --   --   --   --  11  --   --  12   AST  --   --   --   --   --   --   --  22    < > = values in this interval not displayed.

## 2022-12-14 NOTE — PROGRESS NOTES
[facility]    Progress Note - Medicine Service, [facility] TEAM 2       Date of Admission:  12/13/2022    Assessment & Plan   Latricia Davis is a 89 year old female admitted on 12/13/2022. She has a history of DM, HTN, Hx of toxic multinodular goiter, hypothyroidism (post ablation), HLD, dementia who is admitted for chest pain, COVID-19, and UTI.     # Confirmed COVID-19 infection    Symptom Onset Unknown   Date of 1st Positive Test 12/13/2022   Vaccination Status Fully Vaccinated         Fully vaccinated. Tested positive on 12/13/2022, no sick contacts, asymptomatic. Satting well on room air. CT chest showed subtle bibasilar ground glass opacities. CRP < 3, D-dimer 1.78. Discussed with ID; agree with remdesivir given her age and comorbidities however they did not recommend anticoagulation. No indications for steroids.  - started remdesivir (3 day course, through 12/15)  - monitor respiratory symptoms and SpO2     # Chest pain  Left-sided chest pain with some point tenderness. Non-exertional. Somewhat positional. EKG showed RBBB. Trop initially 38 and then plateaued. Likely musculoskeletal vs pleuritic, related to covid infection. Discussed with cardiology, would like to obtain TTE.  - TTE ordered  - on cardiac tele  - lidocaine patch   - tylenol prn     # UTI  #Hx of ESBL UTI  Patient endorses dysuria the day before admission but asymptomatic currently. No fever, abdominal tenderness or CVA tenderness. Patient had Hx of UTI in the past, last urine culture in 12/2021 positive for ESBL. Patient given one dose of ceftriaxone in the ED. Now started on Zosyn (ESBL previously susceptible), but will await return of urine cultures to guide further treatment.   - continue zosyn  - urine cultures pending    Chronic     # Hypothyroid (post ablation)  - PTA levothyroxine     # HTN  - PTA amlodipine     # T2DM  Not on any meds PTA. Glucose ~ 100s     #TIKA (resolved) on CKD  Baseline  1.0. 1.4 on admission but has downtrended.  - encourage oral fluid     Diet: Combination Diet Regular Diet Adult    DVT Prophylaxis: none, contraindicated per ID service  Devine Catheter: Not present  Fluids: none  Central Lines: None  Cardiac Monitoring: ACTIVE order. Indication: Chest pain/ ACS rule out (24 hours)  Code Status: No CPR- Do NOT Intubate      Disposition Plan      Expected Discharge Date: 12/15/2022        Discharge Comments: patient COVID +. Will need to know if her living facility will take her back or if they have special isolation rules        The patient's care was discussed with the Attending Physician, Dr. Owens.    Rosy Quevedo MD  Medicine Service, [address]  Securely message with the Vocera Web Console (learn more here)  Text page via [facility] Paging/Directory   Please see signed in provider for up to date coverage information      Clinically Significant Risk Factors Present on Admission              # Hypoalbuminemia: Lowest albumin = 3.4 g/dL at 12/13/2022  5:00 PM, will monitor as appropriate                  ______________________________________________________________________    Interval History   The patient feels well this morning. She denies shortness of breath, cough, fevers, chills, rhinorrhea. She does endorse occasional left-sided chest pain that she describes as non-exertional. She denies abdominal pain, nausea, vomiting, dysuria.     Data reviewed today: I reviewed all medications, new labs and imaging results over the last 24 hours. I personally reviewed EKG, CT chest.     Physical Exam   Vital Signs: Temp: 98.2  F (36.8  C) Temp src: Oral BP: (!) 156/73 Pulse: 88   Resp: 16 SpO2: 98 % O2 Device: None (Room air)    Weight: 126 lbs 12.23 oz  General Appearance: Awake, alert, cooperative, in no apparent distress  Respiratory: no increased work of breathing, lungs clear to auscultation bilaterally, no crackles or  wheezing  Cardiovascular: regular rate and rhythm, normal S1 and S2, no murmurs or gallops  GI: soft, nontender, nondistended  Skin: no bruising or bleeding  Neuro: AAOx3, cranial nerves grossly intact

## 2022-12-15 NOTE — PROGRESS NOTES
Observation note:  IV antibiotics until susceptibilities return-goal not met, in progress       Admitted from: [facility]   Via: stretcher   Accompanied by: self  Belongings: Placed in closet  Admission paperwork: completed as much as possible with pt orienation   Teaching: Call don't fall, use of console, meal times, visiting hours, orientation to unit, when to call for the RN (angina/sob/dizzyness, etc.), and the importance of safety.   Access: PIV   Telemetry:Placed on pt   Ht./Wt.: complete     Two nurse head-to-toe skin assessment performed by ML Lewis and ML Whitten.  Skin issues noted: L forearm redness/bruising, generally frail skin, dryness in feet.  See PCS for assessment and treatment of wounds and surgical sites.

## 2022-12-15 NOTE — PROGRESS NOTES
Antimicrobial Stewardship Team Note    Antimicrobial Stewardship Program - A joint venture between [facility] and  Physicians to optimize antibiotic management.  NOT a formal consult - Restricted Antimicrobial Review     Patient: Latricia Davis  MRN: [id number]  Allergies: Codeine and Sulfa drugs    Brief Summary: Latricia Davis, 89 y/o female with PMH of ESBL UTI was admitted on 12/13/22 with chest pain and dysuria day prior to admission (12/12).     HPI:  Patient has history of ESBL positive urine culture with most recent one from 12/2021. She presents with no fever, no CVA or abdominal tenderness, and a normal WBC count. Urinalysis positive for nitrites, leukocyte esterase, bacteria, and WBC 67. Corresponding urine culture grew >100,000 CFU/mL of ESBL positive E. Coli (resistant to cefepime, sensitive to Bactrim). Treatment in setting of asymptomatic presentation was debated, but ultimately started given her history. She was given one dose of ceftriaxone in the ED, then started on Zosyn (ESBL previously susceptible) for one dose, and then switched to ertapenem. Today is day 3 of antimicrobial therapy for her UTI.     The patient tested positive for asymptomatic COVID-19 on 12/13. She is fully vaccinated. CT chest showed subtle bibasilar ground glass opacities. She has remained stable on room air. Remdesivir was started given her age and comorbidities. Today is day 3 of remdesivir.        Active Anti-infective Medications   (From admission, onward)                 Start     Stop    12/14/22 2120  remdesivir 100 mg IV soln  100 mg,   Intravenous,   125-500 mL/hr,   EVERY 24 HOURS        COVID-19       See Hyperspace for full Linked Orders Report.    12/16/22 2119 12/14/22 1530  ertapenem (INVanz) 1 g vial to attach to  mL bag  500 mg,   Intravenous,   100 mL/hr,   EVERY 24 HOURS        Urinary Tract Infection        --                  Assessment:   Based on the 2022 IDSA AMR Part 1  Guidance document (https://[email or web address]/practice-guideline/amr-guidance/#GeneralManagementRecommendations), if an antibiotic not active against the causative organism was administered empirically for cystitis, but clinical improvement nonetheless occurred, the panelists agree that it is generally not necessary to repeat a urine culture, change the antibiotic regimen, or extend the planned treatment course. In this patient, symptoms resolved prior to receiving ceftriaxone, which the recovered organism is resistant to. Not unreasonable to stop antibiotics today given symptom resolution prior to antimicrobial administration.     Per the NIH COVID-19 treatment guidelines, for hospitalized patients remdesivir should be administered for 5 days or until hospital discharge, whichever comes first.  Discharge should not be delayed due to remdesivir administration.     Recommendations:  1). Enter end date for ertapenem for today, 12/15/22, day 3 of therapy.    2). If symptoms ongoing, consider changing ertapenem to double strength Bactrim once daily (renally adjusted for CrCl <30 mL/min) for 2 additional days to complete 5 day course. Intolerance to Bactrim in past can be managed with anti-emetic therapy.   3). Continue remdesivir while patient is admitted, though this should not delay discharge.     Pharmacy took the following actions: Called/paged provider, Electronic note created.    Discussed w/ ID Staff-Dr. Morenita Sanchez MD, Hillary Vernon, PharmD, and Guille Solo, PharmD.     Becki Meek, PharmD   Pharmacy Resident   [phone]    P 0611     Vital Signs/Clinical Features:  Vitals         12/13 0700  12/14 0659 12/14 0700  12/15 0659 12/15 0700  12/15 1301   Most Recent      Temp ( F) 97.7 -  98    97.5 -  98.4      98.1     98.1 (36.7) 12/15 1122    Pulse 58 -  73    63 -  88    61 -  67     67 12/15 1122    Resp 14 -  16    16 -  18      16     16 12/15 1122    /70 -  149/67    125/57 -  156/73     143/71 -  152/73     143/71 12/15 1122    SpO2 (%) 94 -  99    97 -  99      98     98 12/15 1122            Labs  Estimated Creatinine Clearance: 28.9 mL/min (A) (based on SCr of 0.98 mg/dL (H)).  Recent Labs   Lab Test 11/10/22  0816 12/13/22  1514 12/13/22  1645 12/13/22  1700 12/14/22  0744 12/15/22  0655   CR 1.01* 1.23* 1.4* 1.22* 1.00* 0.98*       Recent Labs   Lab Test 03/02/22  0942 11/10/22  0816 12/13/22  1514 12/13/22  2139 12/14/22  0744 12/15/22  0655   WBC 6.4 4.6 7.3 7.3 6.6 5.2   HGB 12.4 13.0 13.0 12.6 13.3 13.4   HCT 40.5 41.9 40.5 39.2 41.7 42.9   MCV 98 101* 98 98 98 99    273 363 322 330 323       Recent Labs   Lab Test 01/14/19  1111 01/21/19  1008 05/06/19  0939 01/09/20  1220 02/13/20  1026 07/30/21  0848 11/17/21  0831 12/06/21  1011 03/02/22  0942 11/10/22  0816 12/13/22  1514 12/13/22  1700 12/14/22  0744   BILITOTAL 0.4 0.4  --  0.3  --  0.6  --   --   --   --  0.2 0.3 0.2   ALKPHOS 53 51  --  61  --  47  --   --   --   --  59 55 62   ALBUMIN 3.3* 3.4*   < > 3.9   < > 3.5   < > 3.4* 3.4* 3.4* 3.6 3.4* 3.5   AST 18 15  --  17  --  13  --   --   --   --  22  --  20   ALT 15 11  --  15  --  <9  --   --   --   --  12 11 8*    < > = values in this interval not displayed.       Recent Labs   Lab Test 12/09/21  0732 12/13/22  1700 12/14/22  0744 12/15/22  0655   CRP  --  <3.00 <3.00 4.06   SED 50*  --   --   --          Culture Results:  7-Day Micro Results       Procedure Component Value Units Date/Time    Urine Culture [[id number]]  (Abnormal)  (Susceptibility) Collected: 12/13/22 1604    Order Status: Completed Lab Status: Preliminary result Updated: 12/15/22 0917    Specimen: Urine, Clean Catch      Culture >100,000 CFU/mL Escherichia coli ESBL    Narrative:      Susceptibility testing requested by [facility] [phone], please add fosfomycin      Susceptibility       Escherichia coli ESBL (1)       Antibiotic Interpretation Sensitivity   Method Status    Ampicillin  Resistant >=32 ug/mL KATIE Final    Ampicillin/ Sulbactam  [*]  Susceptible 8 ug/mL KATIE Final    Piperacillin/Tazobactam Susceptible <=4 ug/mL KATIE Final    Cefazolin Resistant >=64 ug/mL KATIE Final     Cefazolin KATIE breakpoints are for the treatment of uncomplicated urinary tract infections. For the treatment of systemic infections, please contact the laboratory for additional testing.       Cefoxitin Susceptible <=4 ug/mL KATIE Final    Ceftazidime Resistant   KATIE Final    Ceftriaxone Resistant   KATIE Final    Cefepime Resistant   KATIE Final    Extended Spectrum Beta-Lactamase  [*]  ESBL Positive Positive ug/mL KATIE Final    Meropenem Susceptible <=0.25 ug/mL KATIE Final     Enterobacterales that are susceptible to meropenem are usually susceptible to ertapenem.       Amikacin  [*]  Susceptible <=2 ug/mL KATIE Final    Gentamicin Susceptible <=1 ug/mL KATIE Final    Tobramycin Susceptible <=1 ug/mL KATIE Final    Ciprofloxacin Resistant >=4 ug/mL KATIE Final    Levofloxacin Resistant >=8 ug/mL KATIE Final    Nitrofurantoin Susceptible <=16 ug/mL KATIE Final    Trimethoprim/Sulfamethoxazole Susceptible <=1/19 ug/mL KATIE Final    Cefotaxime  [*]  Resistant   KATIE Final      Susceptibility Comments       ESBL (extended spectrum beta lactamase) producing organisms require contact precautions.                       [*]  Suppressed Antibiotic                           Recent Labs   Lab Test 22  1430 04/15/22  1430 22  1327 22  1000 22  1604   URINEPH 6.0 6.0 6.0 6.5 6.5   NITRITE Negative Negative Negative Negative Positive*   LEUKEST 250 Juan Manuel/uL* 250 Juan Manuel/uL* Small* Trace* Large*   WBCU 7* 56* 9* 4 67*     Imaging: Echo Complete    Result Date: 2022  [id number]^[email or web address]^PORNTHIRA  [facility] [address] [place] [id number]  Name: RIA MONAE MRN: [id number] : 1929 Study Date: 2022 08:51 AM Age: 89 yrs  Gender: Female Patient Location: [facility] Reason For Study: Chest Pain Ordering Physician: VAUGHN DUBOSE Performed By: Mary Nicole  BSA: 1.6 m2 Height: 63 in Weight: 126 lb HR: 70 BP: 128/57 mmHg ______________________________________________________________________________ Procedure Complete Portable Echo Adult. ______________________________________________________________________________ Interpretation Summary Global and regional left ventricular function is normal with an EF of 60-65%. Global right ventricular function is normal. The right ventricle is normal size. No significant valvular abnormalities. The estimated PA systolic pressure is 41 mmHg. The left atrial pressure is elevated. IVC diameter <2.1 cm collapsing >50% with sniff suggests a normal RA pressure of 3 mmHg. No pericardial effusion is present. There is no prior study for direct comparison. ______________________________________________________________________________ Left Ventricle Global and regional left ventricular function is normal with an EF of 60-65%. Left ventricular wall thickness is normal. Left ventricular size is normal. Grade II or moderate diastolic dysfunction. Diastolic Doppler findings (E/E' ratio and/or other parameters) suggest left ventricular filling pressures are increased.  Right Ventricle Global right ventricular function is normal. The right ventricle is normal size.  Atria The right atria appears normal. Mild left atrial enlargement is present.  Mitral Valve Mild mitral annular calcification is present. Trace mitral insufficiency is present.  Aortic Valve The aortic valve is tricuspid. Trace aortic insufficiency is present.  Tricuspid Valve Mild tricuspid insufficiency is present. The right ventricular systolic pressure is approximated at 37.5 mmHg plus the right atrial pressure.  Pulmonic Valve The valve leaflets are not well visualized. Trace pulmonic insufficiency is present.  Vessels Sinuses of Valsalva 2.9  cm. Ascending aorta 3.4 cm. IVC diameter <2.1 cm collapsing >50% with sniff suggests a normal RA pressure of 3 mmHg.  Pericardium No pericardial effusion is present.  Compared to Previous Study There is no prior study for direct comparison. ______________________________________________________________________________ MMode/2D Measurements & Calculations  IVSd: 0.76 cm LVIDd: 4.4 cm LVIDs: 3.2 cm LVPWd: 0.74 cm FS: 28.8 % LV mass(C)d: 102.2 grams LV mass(C)dI: 64.3 grams/m2 Ao root diam: 2.9 cm asc Aorta Diam: 3.4 cm LVOT diam: 2.0 cm LVOT area: 3.1 cm2 LA Volume (BP): 54.0 ml LA Volume Index (BP): 34.0 ml/m2 RWT: 0.33  Doppler Measurements & Calculations MV E max radha: 120.0 cm/sec MV A max radha: 101.0 cm/sec MV E/A: 1.2 MV dec slope: 591.0 cm/sec2 MV dec time: 0.20 sec PA acc time: 0.10 sec TR max radha: 306.2 cm/sec TR max P.5 mmHg E/E' av.2 Lateral E/e': 14.3 Medial E/e': 18.1  ______________________________________________________________________________ Report approved by: Sana Negrete 2022 10:25 AM       CTA Chest with Contrast    Result Date: 2022  Exam: Computed tomographic angiography of the chest without and with contrast including 3D reformations dated 2022 6:23 PM Clinical information: chest pain, ? ACS, PE, dissection, etc. Technique: Helical scans through the chest obtained before the administration of intravenous contrast media and following the injection of contrast media in the arterial phase. Source images reviewed as well as 3D and multi-planar reconstructions. Contrast: iopamidol (ISOVUE-370) solution 100 mL DLP: 424 mGy*cm Comparison: Chest radiograph 2021 Findings: Thoracic aortic diameters: Aortic annulus: 2.1 cm. Sinuses of Valsalva: 3.0 cm. Ascending aorta: 3.3 cm. Aortic arch: 3.1 cm. Proximal thoracic aorta: 2.8 cm. Mid thoracic aorta: 2.4 cm. Descending thoracic aorta: 2.5 cm. There is normal branching pattern of the great vessels. Origins of the  brachiocephalic artery, left common carotid artery and left subclavian artery show no focal abnormality. The proximal pulmonary vasculature  appears normal. The celiac axis, SMA and GRISELDA are patent. The right kidney has a single patent renal artery and the left kidney has a single patent renal artery. The splenic vein, portal vein, SMV and renal veins are  patent. The hepatic veins and IVC are patent. Chest: No abnormal hilar, mediastinal or axillary lymphadenopathy is seen. Subtle bibasilar nodular ground glass opacities. Bibasilar fibroatelectasis. 3 mm left lower lobe solid pulmonary nodule (series 8, image 192). Aortic valvular calcification. Scattered atherosclerotic calcification of the thoracic aorta. Limited evaluation of the upper abdomen demonstrate splenule, right renal cysts otherwise within normal limits. Partially visualized rotoscoliosis of the thoracolumbar spine with multilevel degenerative changes. No acute or suspicious osseous abnormality.     Impression: 1. Subtle bibasilar nodular groundglass opacities, suggestive of infection. 2. No aortic dissection or central pulmonary embolism as questioned. 3. 3 mm left lower lobe solid pulmonary nodule. If patient is considered high risk for lung cancer, recommend noncontrast chest CT follow-up in 12 months. If patient is considered low risk, no follow-up is recommended.. I have personally reviewed the examination and initial interpretation and I agree with the findings. ELISEO SAWANT MD   SYSTEM ID:  [id number]

## 2022-12-15 NOTE — PROGRESS NOTES
Special isolation/contact  A&Ox3. SBA. VSS on RA on continuous pulse ox. Reg diet needs help with ordering. Redness to L forearm. Incontinent at times. On abx for UTI. Denies SOB. Denies pain. Has a lidocaine patch R rib cage. Will monitor.

## 2022-12-15 NOTE — PROGRESS NOTES
Observation note:  IV antibiotics until susceptibilities return-goal not met, in progress     Neuro: A&O self, short term memory issues. Slept well overnight. Attendant at bedside.  Cardiac/Telemetry: SR, BBB. MD paged at 0535 as pt was having short runs of non sustained SVT and frequent PACs. Mag added to morning labs and 12 lead ECG ordered. 12 lead read SR, 1AVB, BBB and PACs.  Respiratory: RA, continuous pulse ox, sating well  GI/: voiding, LBM 12/15  Diet/Appetite: Regular diet, has not eaten since adx to 6C  Skin: L forearm redness/brusiing, generally has frail skin  LDA: R PIV saline locked  Activity: SBA, bed alarm on  Pain: c/o R rib pain with arm movement, lidocaine patch on, slept well overnight     Plan:  -IV abx  -discharge possibly today pending [facility] policy on COVID

## 2022-12-15 NOTE — CONSULTS
Care Management Initial Consult    General Information Plan discharge Friday, 12/16/22    Assessment completed with: Patient, Care Team Member, Children, Alcides Urias Resident/daughter France Davis  Type of CM/SW Visit: Offer D/C Planning    Primary Care Provider verified and updated as needed: Yes   Readmission within the last 30 days:           Advance Care Planning:            Communication Assessment  Patient's communication style: spoken language (English or Bilingual)    Hearing Difficulty or Deaf: no  Pt is pleasant and able to answer almost all of my questions today        Cognitive  Cognitive/Neuro/Behavioral: .WDL except  Level of Consciousness: confused  Arousal Level: opens eyes spontaneously  Orientation: disoriented to, place, time, situation  Mood/Behavior: anxious, cooperative  Best Language: 0 - No aphasia  Speech: clear    Living Environment:   People in home: alone   Per Pt and daughter France: pt lives at:  [facility] in her own apartment that is subsidized. It is NOT Assisted Living.  Ph: [phone] Fax: [phone]  Supervisor Freda--she can return when MD wishes and can return tomorrow, know she is covid + on 12/13 and no symptoms.  Freda is under the impression that their HH RN sets up her meds 1 day a week, BUT per daughter France they do not--pt does not have a pill box but has her meds in morning and evening containers and takes them on her own. Freda asked for discharge orders/summary and will discuss with France that she thinks an RN should be setting up her meds--they have to pay for this.  Supervisor Freda-pt gets all her meals, 1 shower/week, laundry and linen q week, cleaning every 2 weeks    Fer Hough lives in [place], [place] but currently on vacation in [place] for 1 week France called a cousin locally to see if she could pick her up tomorrow and drive her home--she cannot.  Daughter requesting a med cab ride back.        Current [facility] Arrangements[phone][facility]      Able to return to prior arrangements: yes  Living Arrangement Comments: lives in independent apartment private pays for xtra services    Family/Social Support:  Care provided by: self (with staff for showers/cleaning/meals)  Provides care for: no one, no one, unable/limited ability to care for self  Marital Status:   Children          Description of Support System: Supportive, Involved         Current Resources:   Patient receiving home care services:  No--but are available at her facility for private pay.     Community Resources:  none  Equipment currently used at home:  Life Alert, walker, showerchair  Supplies currently used at home:  none    Employment/Financial:  Employment Status: retired        Financial Concerns:   none          Lifestyle & Psychosocial Needs:  Social Determinants of Health     Tobacco Use: Low Risk      Smoking Tobacco Use: Never     Smokeless Tobacco Use: Never     Passive Exposure: Not on file   Alcohol Use: Not on file   Financial Resource Strain: Not on file   Food Insecurity: Not on file   Transportation Needs: Not on file   Physical Activity: Not on file   Stress: Not on file   Social Connections: Not on file   Intimate Partner Violence: Not on file   Depression: Not on file   Housing Stability: Not on file       Functional Status:  Prior to admission patient needed assistance: see above             Mental Health Status:          Chemical Dependency Status:                Values/Beliefs:  Spiritual, Cultural Beliefs, Quaker Practices, Values that affect care:                 Additional Information:  I called pt on her room phone and explained my role and is very pleasant and could answer almost all of my questions but asked me to call [place] her daughter and I did she is also very pleasant--see above.  Pt had covid 2 months ago and her facility put her in her room and took meals to her. Pt wears a mask \"all the time and I wash my " "hands, I do NOT know how I got covid!\"    Chandni Agudelo RN        "

## 2022-12-15 NOTE — UTILIZATION REVIEW
Concurrent stay review; Secondary Review Determination     [facility]          Under the authority of the Utilization Management Committee, the utilization review process indicated a secondary review on the above patient.  The review outcome is based on review of the medical records, discussions with staff, and applying clinical experience noted on the date of the review.          (x) Observation Status Appropriate - Concurrent stay review    RATIONALE FOR DETERMINATION     89-year-old female with a history of DM, HTN goiter, hypothyroidism, and dementia was admitted  with chest pain, COVID-19, and urinary tract infection.  CRP was undetectable.  D-dimer was elevated at 1.8.  CT of the chest showed subtle bibasilar groundglass opacities.  The case was discussed with infectious disease who recommended remdesivir given her age and comorbidities although they did not recommend anticoagulation.  Steroids were not initiated.  Patient is not hypoxic.  There is concern for urinary tract infection the patient was started on IV antibiotics.  Urine culture is growing ESBL E Coli, same as urine culture a year ago. She is not septic by criteria, she has no fever or leukocytosis.  She is prescribed ertapenem IV for 3 days.  At the time of this review the patient does not meet medical necessity for inpatient hospitalization and observation status is recommended.    Patient is clinically improving and there is no clear indication to change patient's status to inpatient. The severity of illness, intensity of service provided, expected LOS and risk for adverse outcome make the care appropriate for observation.      This document was produced using voice recognition software       The information on this document is developed by the utilization review team in order for the business office to ensure compliance.  This only denotes the appropriateness of proper admission status and does not reflect the quality of care  rendered.         The definitions of Inpatient Status and Observation Status used in making the determination above are those provided in the CMS Coverage Manual, Chapter 1 and Chapter 6, section 70.4.      Sincerely,     ARLEEN DA SILVA MD   Utilization Review  Physician Advisor  [facility]

## 2022-12-15 NOTE — DISCHARGE INSTRUCTIONS
6C RN CC: Please fax discharge orders and summary to:  [facility] in her own apartment that is subsidized. It is NOT Assisted Living.  Ph: [phone] Fax: [phone]  Freda

## 2022-12-15 NOTE — PLAN OF CARE
"BP (!) 141/63 (BP Location: Right arm, Patient Position: Semi-Mclaughlin's, Cuff Size: Adult Regular)   Pulse 66   Temp 97.9  F (36.6  C) (Oral)   Resp 18   Ht 1.537 m (5' 0.5\")   Wt 57.5 kg (126 lb 12.2 oz)   LMP  (LMP Unknown)   SpO2 99%   BMI 24.35 kg/m      Goal Outcome Evaluation:    [id number]    Admitted under OBS status for UTI, COVID-19 and chest pain. No chest pain at this time. On room air sating > 92%, denied SOB or coughing. Alert, disoriented to situation and sometimes place. 1:1  in place watching outside room. Remdesivir administered. Pt c/o right lateral upper back pain with movement, lidocaine patch applied. SBA to bedside commode.   "

## 2022-12-15 NOTE — PROGRESS NOTES
[facility]    Progress Note - Medicine Service, MAROON TEAM 2       Date of Admission:  12/13/2022    Assessment & Plan   Latricia Davis is a 89 year old female admitted on 12/13/2022. She has a history of DM, HTN, Hx of toxic multinodular goiter, hypothyroidism (post ablation), HLD, dementia who is admitted for chest pain, COVID-19, and ESBL E Coli UTI.     Changes today:  - continue remdesivir for COVID tx, ertapenem for ESBL E Coli UTI    #ESBL E Coli UTI  Patient endorses dysuria the day before admission but asymptomatic currently. No fever, abdominal tenderness or CVA tenderness. Patient had Hx of UTI in the past, last urine culture in 12/2021 positive for ESBL. Patient given one dose of ceftriaxone in the ED. Switched to ertapenem yesterday.   - Ertapenem planned for 12/14-12/16    # Confirmed COVID-19 infection    Symptom Onset Unknown   Date of 1st Positive Test 12/13/2022   Vaccination Status Fully Vaccinated         Fully vaccinated. Tested positive on 12/13/2022, no sick contacts, asymptomatic. Satting well on room air. CT chest showed subtle bibasilar ground glass opacities. CRP < 3, D-dimer 1.78. Discussed with ID; agree with remdesivir given her age and comorbidities however they did not recommend anticoagulation. No indications for steroids.  - started remdesivir (3 day course, through 12/15)  - monitor respiratory symptoms and SpO2     # Chest pain  Left-sided chest pain with some point tenderness. Non-exertional. Somewhat positional. EKG showed RBBB. Trop initially 38 and then plateaued. Likely musculoskeletal vs pleuritic, related to covid infection. Discussed with cardiology, TTE ordered and WNL.   - lidocaine patch   - tylenol prn       Chronic     # Hypothyroid (post ablation)  - PTA levothyroxine     # HTN  - PTA amlodipine     # T2DM  Not on any meds PTA. Glucose ~ 100s     #TIKA (resolved) on CKD  Baseline 1.0. 1.4 on admission but has  downtrended.  - encourage oral fluid     Diet: Combination Diet Regular Diet Adult    DVT Prophylaxis: lovenox DVT ppx  Devine Catheter: Not present  Fluids: none  Central Lines: None  Cardiac Monitoring: None  Code Status: No CPR- Do NOT Intubate      Disposition Plan      Expected Discharge Date: 12/16/2022        Discharge Comments: patient COVID +. Will need to know if her living facility will take her back or if they have special isolation rules        The patient's care was discussed with the Attending Physician, Dr. Owens.    Rosy Quevedo MD  Medicine Service, [address]  Securely message with the Vocera Web Console (learn more here)  Text page via [facility] Paging/Directory   Please see signed in provider for up to date coverage information      Clinically Significant Risk Factors Present on Admission              # Hypoalbuminemia: Lowest albumin = 3.4 g/dL at 12/13/2022  5:00 PM, will monitor as appropriate                  ______________________________________________________________________    Interval History   The patient feels well this morning. She denies shortness of breath, cough, fevers. Has some R chest wall pain with movement. No UTI sx.  Wondering about when she can leave.     Data reviewed today: I reviewed all medications, new labs and imaging results over the last 24 hours. I personally reviewed EKG, CT chest.     Physical Exam   Vital Signs: Temp: 98.1  F (36.7  C) Temp src: Oral BP: (!) 143/71 Pulse: 67   Resp: 16 SpO2: 98 % O2 Device: None (Room air)    Weight: 127 lbs 3.2 oz  General Appearance: Awake, alert, cooperative, in no apparent distress  Respiratory: no increased work of breathing, lungs clear to auscultation bilaterally, no crackles or wheezing  Cardiovascular: regular rate and rhythm, normal S1 and S2, no murmurs or gallops  GI: soft, nontender, nondistended, nontender in suprapubic area  Skin: no bruising or  bleeding  Neuro: AAOx3, cranial nerves grossly intact

## 2022-12-16 PROBLEM — E03.8 OTHER SPECIFIED HYPOTHYROIDISM: Status: ACTIVE | Noted: 2022-01-01

## 2022-12-16 PROBLEM — H04.123 DRY EYES: Status: ACTIVE | Noted: 2022-01-01

## 2022-12-16 NOTE — PROGRESS NOTES
Observation status    Pt pleasant, in good spirits; very forgetful and often asks same questions. Reporting generalized arthritis type pain-given tylenol. Fair appetite. Continues on IV abxs.   Attendant at bedside for impulsivity but pt has been calm/cooperative this morning. Contacting [facility] regarding discontinuing attendant

## 2022-12-16 NOTE — PROGRESS NOTES
Care Management Discharge Note    Discharge Date: 12/16/2022     Discharge Disposition: Home    Discharge Services: Medication management. Housekeeping/[employer] (meals/shower q week/laundry)    Discharge DME: No new DME    Discharge Transportation: needs ride arranged as family unable to drive today. Daughter is in [place] and unable to assist.      Education Provided on the Discharge Plan: Yes  Persons Notified of Discharge Plans: Pt, daughter, RN, MD  Patient/Family in Agreement with the Plan: yes    Additional Information:  This writer spoke with nurse Emily at [facility]. Emily confirmed that her and daughter, [place] spoke yesterday and plan is for their nursing to manage pt's meds upon discharge, this is a new service for pt. Per Emily since we are approaching the weekend, they need pt home by 2:30pm today so they can get her setup for the weekend. Emily requested a supply of all pt's medications be sent with pt from [facility] discharge pharmacy since they have not previously managed pt's meds and pt unable to confirm what supply she has. Bladimir FONTENOT who will send scripts.     Spoke with bedside RN and pt's daughter to discuss transportation. Per RN and daughter pt does not require specialized transportation for mobility but concerns for pt's short term memory. It appears pt not safe to discharge home via cab. This writer discussed with daughter she is in agreement with [facility] wheelchair ride, daughter aware that this is not covered by insurance. Confirmed with bedside RN that pt follows commands appropriately and will not need supervision during transport.    [place]  Phone: [phone]  Wheelchair ride arranged for soonest available 2:30pm today, which is soonest available.     Updated nurse Emily at [facility] on soonest available ride, she said that is OK and they will still be able to set up her medications today before they leave. Faxed  MAR, will send discharge orders/summary once completed.    [facility]  NurseAnjum Diaz: [phone]  Fax: [phone]    1315: [facility].     CC will continue to monitor patient's medical condition and progress towards discharge.  Teresa Yousif RN BSN  6C Unit Care Coordinator  Phone number: [phone]  Pager: [phone]

## 2022-12-16 NOTE — PLAN OF CARE
Admitted for chest pain, COVID-19, and ESBL E Coli UTI. PMHx of DM, HTN, Hx of toxic multinodular goiter, hypothyroidism (post ablation), HLD, dementia    Code status: DNR/DNI    Team: Elizabeth Mcnally     Neuro: AOx4, Forgetful. Episodes of impulsive behavior (h/o of dementia. Pt has a sitter for safety)  Cardiac/Tele: No tele. BP's elevated SBP (140's-160's). Denies CP and Palpitations. Other VSS  Respiratory: Room air, no SOB or HIDALGO noted. O2 sats > 92%. LS clear and diminished bilaterally.  GI/: Voids spontaneously without difficulty. Uses the restroom. LBM (12/15)  Diet/Appetite: Regular diet  Skin: Long and dusky BLE. No other deficits  Endocrine: WDL  LDAs: PIV saline locked  Activity: Up SBA.   Pain: Denies pain     Plan: Pt to discharge pending accommodations/isolation needs of prior living facility

## 2022-12-16 NOTE — PLAN OF CARE
Pt admitted 12/13 with CP, COVID 19, and UTI.  Tele discontinued.  VS'S on RA with right rib pain and declined tx.  New Lovenox injection started.  Invanz given for UTI.  Pt also on Redeliver for COVID.  Sitter at bedside.  Observation cost instructions given and discussed to pt from .  Ob's goal still pending throughout the q4h note window.  Continue to monitor and with POC.

## 2022-12-16 NOTE — PLAN OF CARE
"    Pt cleared for discharge back to home apartment today after last IV abx dose. Pt reports feeling \"stiff from sitting here\" but otherwise no complaints. Vss on RA; denies any respiratory symptoms despite being covid + diagnosed 12/13 on admit to ER. Pt is very forgetful and repetitive with questions but has been using her call light appropriately and bedside attendant was discontinued late morning.   Plan for pt to have more assistance with medications upon return to apartment. Care coordinator set up transport ride home at 1430. Rxs being filled now here and will be sent with pt upon discharge.      "

## 2022-12-16 NOTE — DISCHARGE SUMMARY
[facility]  Discharge Summary - Medicine & Pediatrics       Date of Admission:  12/13/2022  Date of Discharge:  12/16/2022  Discharging Provider: Dr. Urias  Discharge Service: Medicine Service, MINNIE TEAM 2    Discharge Diagnoses   ESBL E Coli UTI  COVID-19 infection    TIKA on CKD  Noncardiac chest pain    Follow-ups Needed After Discharge   Follow-up Appointments     Follow Up and recommended labs and tests      Follow up with primary care provider, Sandra Moore, within 7 days for   hospital follow- up.  No follow up labs or test are needed.         Follow Up and recommended labs and tests      Follow up with primary care provider, Sandra Moore, within 7 days for   hospital follow- up.  No follow up labs or test are needed.             Discharge Disposition   Discharged to home  Condition at discharge: Stable      Hospital Course   Latricia Davis was admitted on 12/13/2022 for chest pain.  The following problems were addressed during her hospitalization:    Non-cardiac Chest pain  Left-sided chest pain with some point tenderness. Non-exertional. Somewhat positional. EKG showed RBBB. Trop initially 38 and then plateaued. Likely musculoskeletal vs pleuritic, related to covid infection. Discussed with cardiology, TTE ordered and WNL.     ESBL E Coli UTI  Patient endorsed dysuria the day before admission. Patient had Hx of UTI in the past, last urine culture in 12/2021 positive for ESBL. Patient given one dose of ceftriaxone in the ED. Switched to ertapenem after sensitivities came back (see below). 3d course of ertapenem.      Confirmed COVID-19 infection    Symptom Onset Unknown   Date of 1st Positive Test 12/13/2022   Vaccination Status Fully Vaccinated         Fully vaccinated. Tested positive on 12/13/2022, no sick contacts, asymptomatic. Satting well on room air. CT chest showed subtle bibasilar ground glass opacities. CRP < 3, D-dimer 1.78. Discussed with ID. Pt  given 3d course of remdesivir given age and other comorbidities. Pt never became hypoxic.       TIKA on CKD-resolved  Baseline 1.0. 1.4 on admission but downtrended to her baseline.       Consultations This Hospital Stay   PHARMACY IP CONSULT  CARE MANAGEMENT / SOCIAL WORK IP CONSULT    Code Status   No CPR- Do NOT Intubate       The patient was discussed with MD Kristan Conde[address] [facility]  [address] [facility] [place] [address]  Phone: [phone]  ______________________________________________________________________    Physical Exam   Vital Signs: Temp: 97.6  F (36.4  C) Temp src: Oral BP: (!) 146/81 Pulse: 64   Resp: 18 SpO2: 97 % O2 Device: None (Room air)    Weight: 127 lbs 3.2 oz  General Appearance:  Awake, alert, cooperative, in no apparent distress  Respiratory: no increased work of breathing, lungs clear to auscultation bilaterally, no crackles or wheezing  Cardiovascular: regular rate and rhythm, normal S1 and S2, no murmurs or gallops  GI: soft, nontender, nondistended  Skin: no bruising or bleeding  Neuro: AAOx3, cranial nerves grossly intact      Primary Care Physician   Sandra Moore    Discharge Orders      Reason for your hospital stay    Latricia,    You were hospitalized for evaluation of chest pain. You were found to have a COVID infection, and received an IV infusion medicine (remdesivir) to help prevent worsening infection. Your chest pain was felt not to be related to your heart. You had a normal cardiac ultrasound. We treated you for a UTI with IV antibiotics for 3 days.     Activity    Your activity upon discharge: activity as tolerated     Follow Up and recommended labs and tests    Follow up with primary care provider, Sandra Moore, within 7 days for hospital follow- up.  No follow up labs or test are needed.     Reason for your hospital stay    Dear Latricia,    You were hospitalized for chest pain. You were found to have a COVID  infection and a UTI. You were given and IV medicine (remdesivir) to help prevent your COVID infection from getting worse. You were given 3 days of IV antibiotics for your UTI. Your chest pain was evaluated by the cardiology team and was felt to not be serious or related to the heart.     Activity    Your activity upon discharge: activity as tolerated     Follow Up and recommended labs and tests    Follow up with primary care provider, Sandra Moore, within 7 days for hospital follow- up.  No follow up labs or test are needed.     Diet    Follow this diet upon discharge: Orders Placed This Encounter      Combination Diet Regular Diet Adult     Diet    Follow this diet upon discharge: Orders Placed This Encounter      Combination Diet Regular Diet Adult      Diet       Significant Results and Procedures     Urine culture 12/13/22:    Escherichia coli ESBL       KATIE     Ampicillin >=32 ug/mL Resistant     Cefazolin >=64 ug/mL Resistant 1     Cefepime  Resistant     Cefoxitin <=4 ug/mL Susceptible     Ceftazidime  Resistant     Ceftriaxone  Resistant     Ciprofloxacin >=4 ug/mL Resistant     Fosfomycin 1 ug/mL Susceptible     Gentamicin <=1 ug/mL Susceptible     Levofloxacin >=8 ug/mL Resistant     Meropenem <=0.25 ug/mL Susceptible 2     Nitrofurantoin <=16 ug/mL Susceptible     Piperacillin/Tazobactam <=4 ug/mL Susceptible     Tobramycin <=1 ug/mL Susceptible     Trimethoprim/Sulfamethoxazole <=1/19 ug/mL Susceptible      12/13/22:   TTE  Interpretation Summary  Global and regional left ventricular function is normal with an EF of 60-65%.  Global right ventricular function is normal. The right ventricle is normal  size.  No significant valvular abnormalities.  The estimated PA systolic pressure is 41 mmHg.  The left atrial pressure is elevated.  IVC diameter <2.1 cm collapsing >50% with sniff suggests a normal RA pressure of 3 mmHg.  No pericardial effusion is present.  There is no prior study for direct  comparison.        Discharge Medications   Discharge Medication List as of 12/16/2022  2:12 PM      CONTINUE these medications which have CHANGED    Details   acetaminophen (TYLENOL) 325 MG tablet Take 2 tablets (650 mg) by mouth daily as needed for mild pain, Disp-60 tablet, R-3, E-Prescribe      amLODIPine (NORVASC) 2.5 MG tablet Take 1 tablet (2.5 mg) by mouth daily for 360 days, Disp-90 tablet, R-3, E-Prescribe      aspirin 81 MG EC tablet Take 1 tablet (81 mg) by mouth daily for 360 days, Disp-30 tablet, R-11, E-Prescribe      glycerin-hypromellose- (ARTIFICIAL TEARS) 0.2-0.2-1 % SOLN ophthalmic solution Place 1 drop into both eyes daily as needed for dry eyes, Disp-30 mL, R-3, E-Prescribe      levothyroxine (SYNTHROID/LEVOTHROID) 100 MCG tablet Take 1 tablet (100 mcg) by mouth daily for 360 days Before breakfast, Disp-90 tablet, R-3, E-Prescribe      Multiple Vitamins-Minerals (PRESERVISION AREDS 2) CAPS Take 1 capsule by mouth 2 times daily, Disp-60 capsule, R-11, E-Prescribe      pravastatin (PRAVACHOL) 20 MG tablet Take 1 tablet (20 mg) by mouth daily for 60 days, Disp-90 tablet, R-3, E-Prescribe      sodium chloride (NAKIA 128) 5 % ophthalmic solution Place 1 drop into both eyes daily 1-2 times daily, Disp-30 mL, R-3, E-Prescribe      sucralfate (CARAFATE) 1 GM tablet Take 1 tablet (1 g) by mouth 3 times daily for 360 days, Disp-270 tablet, R-3, E-Prescribe           Allergies   Allergies   Allergen Reactions     Codeine Other (See Comments)     Hard to wake up     Sulfa Drugs Nausea

## 2023-01-01 ENCOUNTER — NURSE TRIAGE (OUTPATIENT)
Dept: NURSING | Facility: CLINIC | Age: 88
End: 2023-01-01
Payer: COMMERCIAL

## 2023-01-01 ENCOUNTER — LAB REQUISITION (OUTPATIENT)
Dept: LAB | Facility: CLINIC | Age: 88
End: 2023-01-01
Payer: COMMERCIAL

## 2023-01-01 DIAGNOSIS — N18.30 CHRONIC KIDNEY DISEASE, STAGE 3 UNSPECIFIED (H): ICD-10-CM

## 2023-01-01 DIAGNOSIS — R35.0 FREQUENCY OF MICTURITION: ICD-10-CM

## 2023-01-01 DIAGNOSIS — E03.9 HYPOTHYROIDISM, UNSPECIFIED: ICD-10-CM

## 2023-01-01 DIAGNOSIS — N17.9 ACUTE KIDNEY FAILURE, UNSPECIFIED (H): ICD-10-CM

## 2023-01-01 DIAGNOSIS — R00.1 BRADYCARDIA, UNSPECIFIED: ICD-10-CM

## 2023-01-01 LAB
ALBUMIN UR-MCNC: NEGATIVE MG/DL
ANION GAP SERPL CALCULATED.3IONS-SCNC: 12 MMOL/L (ref 7–15)
ANION GAP SERPL CALCULATED.3IONS-SCNC: 12 MMOL/L (ref 7–15)
ANION GAP SERPL CALCULATED.3IONS-SCNC: 13 MMOL/L (ref 7–15)
ANION GAP SERPL CALCULATED.3IONS-SCNC: 14 MMOL/L (ref 7–15)
APPEARANCE UR: CLEAR
BILIRUB UR QL STRIP: NEGATIVE
BUN SERPL-MCNC: 21.9 MG/DL (ref 8–23)
BUN SERPL-MCNC: 26.6 MG/DL (ref 8–23)
BUN SERPL-MCNC: 29.4 MG/DL (ref 8–23)
BUN SERPL-MCNC: 51.3 MG/DL (ref 8–23)
CALCIUM SERPL-MCNC: 10.1 MG/DL (ref 8.2–9.6)
CALCIUM SERPL-MCNC: 9.5 MG/DL (ref 8.2–9.6)
CALCIUM SERPL-MCNC: 9.8 MG/DL (ref 8.2–9.6)
CALCIUM SERPL-MCNC: 9.9 MG/DL (ref 8.2–9.6)
CHLORIDE SERPL-SCNC: 105 MMOL/L (ref 98–107)
CHLORIDE SERPL-SCNC: 106 MMOL/L (ref 98–107)
COLOR UR AUTO: ABNORMAL
CREAT SERPL-MCNC: 1.04 MG/DL (ref 0.51–0.95)
CREAT SERPL-MCNC: 1.11 MG/DL (ref 0.51–0.95)
CREAT SERPL-MCNC: 1.13 MG/DL (ref 0.51–0.95)
CREAT SERPL-MCNC: 1.37 MG/DL (ref 0.51–0.95)
DEPRECATED HCO3 PLAS-SCNC: 20 MMOL/L (ref 22–29)
DEPRECATED HCO3 PLAS-SCNC: 21 MMOL/L (ref 22–29)
DEPRECATED HCO3 PLAS-SCNC: 22 MMOL/L (ref 22–29)
DEPRECATED HCO3 PLAS-SCNC: 23 MMOL/L (ref 22–29)
GFR SERPL CREATININE-BSD FRML MDRD: 36 ML/MIN/1.73M2
GFR SERPL CREATININE-BSD FRML MDRD: 45 ML/MIN/1.73M2
GFR SERPL CREATININE-BSD FRML MDRD: 46 ML/MIN/1.73M2
GFR SERPL CREATININE-BSD FRML MDRD: 50 ML/MIN/1.73M2
GLUCOSE SERPL-MCNC: 86 MG/DL (ref 70–99)
GLUCOSE SERPL-MCNC: 87 MG/DL (ref 70–99)
GLUCOSE SERPL-MCNC: 91 MG/DL (ref 70–99)
GLUCOSE SERPL-MCNC: 97 MG/DL (ref 70–99)
GLUCOSE UR STRIP-MCNC: NEGATIVE MG/DL
HGB BLD-MCNC: 11.1 G/DL (ref 11.7–15.7)
HGB BLD-MCNC: 13.7 G/DL (ref 11.7–15.7)
HGB UR QL STRIP: NEGATIVE
KETONES UR STRIP-MCNC: NEGATIVE MG/DL
LEUKOCYTE ESTERASE UR QL STRIP: NEGATIVE
MUCOUS THREADS #/AREA URNS LPF: PRESENT /LPF
NITRATE UR QL: NEGATIVE
PH UR STRIP: 5.5 [PH] (ref 5–7)
POTASSIUM SERPL-SCNC: 4.2 MMOL/L (ref 3.4–5.3)
POTASSIUM SERPL-SCNC: 4.5 MMOL/L (ref 3.4–5.3)
POTASSIUM SERPL-SCNC: 4.7 MMOL/L (ref 3.4–5.3)
POTASSIUM SERPL-SCNC: 4.9 MMOL/L (ref 3.4–5.3)
RBC URINE: <1 /HPF
SODIUM SERPL-SCNC: 139 MMOL/L (ref 136–145)
SODIUM SERPL-SCNC: 140 MMOL/L (ref 136–145)
SODIUM SERPL-SCNC: 140 MMOL/L (ref 136–145)
SODIUM SERPL-SCNC: 141 MMOL/L (ref 136–145)
SP GR UR STRIP: 1.02 (ref 1–1.03)
TSH SERPL DL<=0.005 MIU/L-ACNC: 1.51 UIU/ML (ref 0.3–4.2)
UROBILINOGEN UR STRIP-MCNC: NORMAL MG/DL
WBC URINE: <1 /HPF

## 2023-01-01 PROCEDURE — P9604 ONE-WAY ALLOW PRORATED TRIP: HCPCS | Mod: ORL | Performed by: FAMILY MEDICINE

## 2023-01-01 PROCEDURE — 81001 URINALYSIS AUTO W/SCOPE: CPT | Mod: ORL | Performed by: FAMILY MEDICINE

## 2023-01-01 PROCEDURE — 80048 BASIC METABOLIC PNL TOTAL CA: CPT | Mod: ORL | Performed by: FAMILY MEDICINE

## 2023-01-01 PROCEDURE — 85018 HEMOGLOBIN: CPT | Mod: ORL | Performed by: FAMILY MEDICINE

## 2023-01-01 PROCEDURE — 36415 COLL VENOUS BLD VENIPUNCTURE: CPT | Mod: ORL | Performed by: FAMILY MEDICINE

## 2023-01-01 PROCEDURE — 84443 ASSAY THYROID STIM HORMONE: CPT | Mod: ORL | Performed by: FAMILY MEDICINE

## 2023-01-01 PROCEDURE — 87186 SC STD MICRODIL/AGAR DIL: CPT | Mod: ORL | Performed by: FAMILY MEDICINE

## 2023-03-21 NOTE — TELEPHONE ENCOUNTER
Nurse Triage SBAR    Situation: Chest pain    Background: Patient calling. Chest pain started about 1 hour ago. Worsened about 30 minutes ago.     Assessment: Constant chest pain/discomfort. Pain goes into her jaw. Heavy like pain.    Protocol Recommended Disposition: Call 911    Recommendation: According to the protocol, Patient should Call 911. Advised Patient to Call 911. Care advice given. Patient verbalizes understanding and agrees with plan of care.     Maria E Henriquez RN Nursing Advisor 3/20/2023 9:17 PM     Reason for Disposition    [1] Chest pain lasts > 5 minutes AND [2] age > 44    [1] Chest pain lasts > 5 minutes AND [2] described as crushing, pressure-like, or heavy    Additional Information    Negative: SEVERE difficulty breathing (e.g., struggling for each breath, speaks in single words)    Negative: Difficult to awaken or acting confused (e.g., disoriented, slurred speech)    Negative: Shock suspected (e.g., cold/pale/clammy skin, too weak to stand, low BP, rapid pulse)    Negative: Passed out (i.e., lost consciousness, collapsed and was not responding)    Protocols used: CHEST PAIN-A-